# Patient Record
Sex: FEMALE | Race: WHITE | NOT HISPANIC OR LATINO | Employment: UNEMPLOYED | URBAN - METROPOLITAN AREA
[De-identification: names, ages, dates, MRNs, and addresses within clinical notes are randomized per-mention and may not be internally consistent; named-entity substitution may affect disease eponyms.]

---

## 2021-04-12 LAB
BILIRUB SERPL-MCNC: NEGATIVE MG/DL
BLOOD URINE, POC: NORMAL
CLARITY, POC UA: NORMAL
COLOR, POC UA: YELLOW
GLUCOSE UR QL STRIP: NEGATIVE
KETONES UR QL STRIP: NEGATIVE
LEUKOCYTE EST, POC UA: NEGATIVE
NITRITE, POC UA: NEGATIVE
PH, POC UA: 7.5
POC BETA-HCG (QUAL): NEGATIVE
PROTEIN, POC: NEGATIVE
SPECIFIC GRAVITY, POC UA: 1.02
UROBILINOGEN, POC UA: NORMAL

## 2022-04-07 ENCOUNTER — HISTORICAL (OUTPATIENT)
Dept: ADMINISTRATIVE | Facility: HOSPITAL | Age: 37
End: 2022-04-07

## 2022-04-14 LAB
PAP RECOMMENDATION EXT: NORMAL
PAP SMEAR: NORMAL

## 2022-04-24 VITALS
HEIGHT: 67 IN | SYSTOLIC BLOOD PRESSURE: 123 MMHG | DIASTOLIC BLOOD PRESSURE: 72 MMHG | WEIGHT: 126.44 LBS | BODY MASS INDEX: 19.84 KG/M2

## 2022-09-15 ENCOUNTER — HISTORICAL (OUTPATIENT)
Dept: ADMINISTRATIVE | Facility: HOSPITAL | Age: 37
End: 2022-09-15

## 2023-01-11 ENCOUNTER — DOCUMENTATION ONLY (OUTPATIENT)
Dept: ADMINISTRATIVE | Facility: HOSPITAL | Age: 38
End: 2023-01-11

## 2023-04-04 ENCOUNTER — CLINICAL SUPPORT (OUTPATIENT)
Dept: OBSTETRICS AND GYNECOLOGY | Facility: CLINIC | Age: 38
End: 2023-04-04
Payer: MEDICAID

## 2023-04-04 VITALS
SYSTOLIC BLOOD PRESSURE: 120 MMHG | WEIGHT: 125.25 LBS | DIASTOLIC BLOOD PRESSURE: 70 MMHG | BODY MASS INDEX: 19.66 KG/M2 | HEIGHT: 67 IN

## 2023-04-04 DIAGNOSIS — Z30.9 ENCOUNTER FOR CONTRACEPTIVE MANAGEMENT, UNSPECIFIED TYPE: Primary | ICD-10-CM

## 2023-04-04 PROCEDURE — 99213 OFFICE O/P EST LOW 20 MIN: CPT | Mod: ,,, | Performed by: NURSE PRACTITIONER

## 2023-04-04 PROCEDURE — 99213 PR OFFICE/OUTPT VISIT, EST, LEVL III, 20-29 MIN: ICD-10-PCS | Mod: ,,, | Performed by: NURSE PRACTITIONER

## 2023-04-04 RX ORDER — MEDROXYPROGESTERONE ACETATE 150 MG/ML
150 INJECTION, SUSPENSION INTRAMUSCULAR
COMMUNITY

## 2023-04-04 RX ORDER — DEXTROAMPHETAMINE SACCHARATE, AMPHETAMINE ASPARTATE, DEXTROAMPHETAMINE SULFATE AND AMPHETAMINE SULFATE 3.75; 3.75; 3.75; 3.75 MG/1; MG/1; MG/1; MG/1
15 TABLET ORAL
COMMUNITY
Start: 2022-04-14 | End: 2023-09-12 | Stop reason: ALTCHOICE

## 2023-04-04 RX ORDER — MEDROXYPROGESTERONE ACETATE 150 MG/ML
150 INJECTION, SUSPENSION INTRAMUSCULAR
Status: COMPLETED | OUTPATIENT
Start: 2023-04-04 | End: 2023-04-04

## 2023-04-04 RX ADMIN — MEDROXYPROGESTERONE ACETATE 150 MG: 150 INJECTION, SUSPENSION INTRAMUSCULAR at 03:04

## 2023-04-04 NOTE — PROGRESS NOTES
"Chief Complaint:  Injections (Present to clinic for depo injecttion, no c/o's)    History of Present Illness:  Noé is a 37 y.o.  presents for depo provera. Last injection was 2023       Review of Systems:  Patient reports no abdominal pain. She reports no hematuria, no abnormal bleeding, no flank pain, no trouble urinating, no incontinence, no rash, no lesion, no discharge, no vaginal odor, and no vaginal itching.     OB History          2    Para   1    Term   1            AB   1    Living   1         SAB        IAB        Ectopic        Multiple        Live Births   1                 History reviewed. No pertinent past medical history.      Current Outpatient Medications:     dextroamphetamine-amphetamine (ADDERALL) 15 mg tablet, Take 15 mg by mouth., Disp: , Rfl:     medroxyPROGESTERone (DEPO-PROVERA) 150 mg/mL injection, Inject 150 mg into the muscle., Disp: , Rfl:   No current facility-administered medications for this visit.    Review of patient's allergies indicates:  No Known Allergies    Past Surgical History:   Procedure Laterality Date    AUGMENTATION OF BREAST      CERVICAL BIOPSY  W/ LOOP ELECTRODE EXCISION         History reviewed. No pertinent family history.    Social History     Socioeconomic History    Marital status: Single   Tobacco Use    Smoking status: Never     Passive exposure: Never    Smokeless tobacco: Never   Substance and Sexual Activity    Alcohol use: Yes     Comment: social    Drug use: Never    Sexual activity: Yes     Partners: Male     Birth control/protection: Injection       Physical Exam:  /70 (BP Location: Left arm)   Ht 5' 7" (1.702 m)   Wt 56.8 kg (125 lb 3.5 oz)   BMI 19.61 kg/m²     APPEARANCE: Well nourished, well developed, in no acute distress.  PSYCH: Appropriate mood and affect.  EXTREMITIES: No edema.       Assessment/Plan:  Encounter for contraceptive management, unspecified type  -     medroxyPROGESTERone (DEPO-PROVERA) " injection 150 mg      Follow up in about 3 months (around 7/4/2023) for DEPO. In addition to their scheduled FU, the patient has also been instructed to follow up on as needed basis  All questions were answered and the patient voiced understanding of the above issues.

## 2023-05-10 DIAGNOSIS — M79.641 PAIN IN RIGHT HAND: Primary | ICD-10-CM

## 2023-05-12 ENCOUNTER — OFFICE VISIT (OUTPATIENT)
Dept: ORTHOPEDICS | Facility: CLINIC | Age: 38
End: 2023-05-12
Payer: MEDICAID

## 2023-05-12 ENCOUNTER — HOSPITAL ENCOUNTER (OUTPATIENT)
Dept: RADIOLOGY | Facility: HOSPITAL | Age: 38
Discharge: HOME | End: 2023-05-12
Attending: STUDENT IN AN ORGANIZED HEALTH CARE EDUCATION/TRAINING PROGRAM
Payer: MEDICAID

## 2023-05-12 VITALS — WEIGHT: 130.63 LBS | BODY MASS INDEX: 20.5 KG/M2 | HEIGHT: 67 IN

## 2023-05-12 DIAGNOSIS — M79.641 PAIN IN RIGHT HAND: ICD-10-CM

## 2023-05-12 DIAGNOSIS — M79.641 PAIN IN RIGHT HAND: Primary | ICD-10-CM

## 2023-05-12 PROCEDURE — 1159F PR MEDICATION LIST DOCUMENTED IN MEDICAL RECORD: ICD-10-PCS | Mod: CPTII,,, | Performed by: ORTHOPAEDIC SURGERY

## 2023-05-12 PROCEDURE — 99203 PR OFFICE/OUTPT VISIT, NEW, LEVL III, 30-44 MIN: ICD-10-PCS | Mod: S$PBB,,, | Performed by: ORTHOPAEDIC SURGERY

## 2023-05-12 PROCEDURE — 3008F PR BODY MASS INDEX (BMI) DOCUMENTED: ICD-10-PCS | Mod: CPTII,,, | Performed by: ORTHOPAEDIC SURGERY

## 2023-05-12 PROCEDURE — 1160F PR REVIEW ALL MEDS BY PRESCRIBER/CLIN PHARMACIST DOCUMENTED: ICD-10-PCS | Mod: CPTII,,, | Performed by: ORTHOPAEDIC SURGERY

## 2023-05-12 PROCEDURE — 99203 OFFICE O/P NEW LOW 30 MIN: CPT | Mod: S$PBB,,, | Performed by: ORTHOPAEDIC SURGERY

## 2023-05-12 PROCEDURE — 99213 OFFICE O/P EST LOW 20 MIN: CPT | Mod: PBBFAC

## 2023-05-12 PROCEDURE — 1160F RVW MEDS BY RX/DR IN RCRD: CPT | Mod: CPTII,,, | Performed by: ORTHOPAEDIC SURGERY

## 2023-05-12 PROCEDURE — 73130 X-RAY EXAM OF HAND: CPT | Mod: TC,RT

## 2023-05-12 PROCEDURE — 1159F MED LIST DOCD IN RCRD: CPT | Mod: CPTII,,, | Performed by: ORTHOPAEDIC SURGERY

## 2023-05-12 PROCEDURE — 3008F BODY MASS INDEX DOCD: CPT | Mod: CPTII,,, | Performed by: ORTHOPAEDIC SURGERY

## 2023-05-12 RX ORDER — DEXTROAMPHETAMINE SACCHARATE, AMPHETAMINE ASPARTATE, DEXTROAMPHETAMINE SULFATE AND AMPHETAMINE SULFATE 7.5; 7.5; 7.5; 7.5 MG/1; MG/1; MG/1; MG/1
1 TABLET ORAL 2 TIMES DAILY
COMMUNITY
Start: 2023-04-28

## 2023-05-12 RX ORDER — OXYCODONE AND ACETAMINOPHEN 7.5; 325 MG/1; MG/1
1 TABLET ORAL EVERY 6 HOURS PRN
COMMUNITY
Start: 2023-05-08 | End: 2023-09-12 | Stop reason: ALTCHOICE
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
## 2023-05-15 NOTE — PROGRESS NOTES
Faculty Attestation: Noé Raines  was seen at [facility] in the Orthopaedic Clinic. Patient chart reviewed. History of Present Illness, Physical Exam, and Assessment and Plan reviewed. Treatment plan is reasonable and appropriate. Compliance with treatment recommendations is important. No procedure was performed.     Govind Elizalde MD  Orthopaedic Surgery

## 2023-05-29 ENCOUNTER — HOSPITAL ENCOUNTER (OUTPATIENT)
Dept: RADIOLOGY | Facility: HOSPITAL | Age: 38
Discharge: HOME | End: 2023-05-29
Attending: SPECIALIST
Payer: MEDICAID

## 2023-05-29 ENCOUNTER — OFFICE VISIT (OUTPATIENT)
Dept: ORTHOPEDICS | Facility: CLINIC | Age: 38
End: 2023-05-29
Payer: MEDICAID

## 2023-05-29 VITALS — BODY MASS INDEX: 20.4 KG/M2 | WEIGHT: 130 LBS | HEIGHT: 67 IN

## 2023-05-29 DIAGNOSIS — M79.641 RIGHT HAND PAIN: Primary | ICD-10-CM

## 2023-05-29 DIAGNOSIS — M79.641 RIGHT HAND PAIN: ICD-10-CM

## 2023-05-29 PROCEDURE — 99213 OFFICE O/P EST LOW 20 MIN: CPT | Mod: S$PBB,,, | Performed by: SPECIALIST

## 2023-05-29 PROCEDURE — 3008F PR BODY MASS INDEX (BMI) DOCUMENTED: ICD-10-PCS | Mod: CPTII,,, | Performed by: SPECIALIST

## 2023-05-29 PROCEDURE — 1160F PR REVIEW ALL MEDS BY PRESCRIBER/CLIN PHARMACIST DOCUMENTED: ICD-10-PCS | Mod: CPTII,,, | Performed by: SPECIALIST

## 2023-05-29 PROCEDURE — 99213 PR OFFICE/OUTPT VISIT, EST, LEVL III, 20-29 MIN: ICD-10-PCS | Mod: S$PBB,,, | Performed by: SPECIALIST

## 2023-05-29 PROCEDURE — 1159F PR MEDICATION LIST DOCUMENTED IN MEDICAL RECORD: ICD-10-PCS | Mod: CPTII,,, | Performed by: SPECIALIST

## 2023-05-29 PROCEDURE — 99213 OFFICE O/P EST LOW 20 MIN: CPT | Mod: PBBFAC

## 2023-05-29 PROCEDURE — 1159F MED LIST DOCD IN RCRD: CPT | Mod: CPTII,,, | Performed by: SPECIALIST

## 2023-05-29 PROCEDURE — 73130 X-RAY EXAM OF HAND: CPT | Mod: TC,RT

## 2023-05-29 PROCEDURE — 3008F BODY MASS INDEX DOCD: CPT | Mod: CPTII,,, | Performed by: SPECIALIST

## 2023-05-29 PROCEDURE — 1160F RVW MEDS BY RX/DR IN RCRD: CPT | Mod: CPTII,,, | Performed by: SPECIALIST
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
## 2023-05-29 NOTE — PROGRESS NOTES
Faculty Attestation: Noé Raines  was seen at [facility] in the Orthopaedic Clinic. Patient chart reviewed. History of Present Illness, Physical Exam, and Assessment and Plan reviewed. Treatment plan is reasonable and appropriate. Compliance with treatment recommendations is important. No procedure was performed.     Vel Garcia MD  Orthopaedic Surgery

## 2023-06-27 ENCOUNTER — OFFICE VISIT (OUTPATIENT)
Dept: OBSTETRICS AND GYNECOLOGY | Facility: CLINIC | Age: 38
End: 2023-06-27
Payer: MEDICAID

## 2023-06-27 VITALS
WEIGHT: 125 LBS | SYSTOLIC BLOOD PRESSURE: 120 MMHG | HEIGHT: 67 IN | HEART RATE: 90 BPM | BODY MASS INDEX: 19.62 KG/M2 | DIASTOLIC BLOOD PRESSURE: 60 MMHG

## 2023-06-27 DIAGNOSIS — Z30.9 ENCOUNTER FOR CONTRACEPTIVE MANAGEMENT, UNSPECIFIED TYPE: ICD-10-CM

## 2023-06-27 DIAGNOSIS — K64.4 EXTERNAL HEMORRHOIDS: ICD-10-CM

## 2023-06-27 DIAGNOSIS — Z01.419 ROUTINE GYNECOLOGICAL EXAMINATION: Primary | ICD-10-CM

## 2023-06-27 PROCEDURE — 3078F PR MOST RECENT DIASTOLIC BLOOD PRESSURE < 80 MM HG: ICD-10-PCS | Mod: CPTII,,, | Performed by: OBSTETRICS & GYNECOLOGY

## 2023-06-27 PROCEDURE — 99395 PREV VISIT EST AGE 18-39: CPT | Mod: ,,, | Performed by: OBSTETRICS & GYNECOLOGY

## 2023-06-27 PROCEDURE — 1160F RVW MEDS BY RX/DR IN RCRD: CPT | Mod: CPTII,,, | Performed by: OBSTETRICS & GYNECOLOGY

## 2023-06-27 PROCEDURE — 3074F PR MOST RECENT SYSTOLIC BLOOD PRESSURE < 130 MM HG: ICD-10-PCS | Mod: CPTII,,, | Performed by: OBSTETRICS & GYNECOLOGY

## 2023-06-27 PROCEDURE — 99213 PR OFFICE/OUTPT VISIT, EST, LEVL III, 20-29 MIN: ICD-10-PCS | Mod: 25,,, | Performed by: OBSTETRICS & GYNECOLOGY

## 2023-06-27 PROCEDURE — 3078F DIAST BP <80 MM HG: CPT | Mod: CPTII,,, | Performed by: OBSTETRICS & GYNECOLOGY

## 2023-06-27 PROCEDURE — 99213 OFFICE O/P EST LOW 20 MIN: CPT | Mod: 25,,, | Performed by: OBSTETRICS & GYNECOLOGY

## 2023-06-27 PROCEDURE — 3008F BODY MASS INDEX DOCD: CPT | Mod: CPTII,,, | Performed by: OBSTETRICS & GYNECOLOGY

## 2023-06-27 PROCEDURE — 99395 PR PREVENTIVE VISIT,EST,18-39: ICD-10-PCS | Mod: ,,, | Performed by: OBSTETRICS & GYNECOLOGY

## 2023-06-27 PROCEDURE — 1160F PR REVIEW ALL MEDS BY PRESCRIBER/CLIN PHARMACIST DOCUMENTED: ICD-10-PCS | Mod: CPTII,,, | Performed by: OBSTETRICS & GYNECOLOGY

## 2023-06-27 PROCEDURE — 1159F MED LIST DOCD IN RCRD: CPT | Mod: CPTII,,, | Performed by: OBSTETRICS & GYNECOLOGY

## 2023-06-27 PROCEDURE — 3008F PR BODY MASS INDEX (BMI) DOCUMENTED: ICD-10-PCS | Mod: CPTII,,, | Performed by: OBSTETRICS & GYNECOLOGY

## 2023-06-27 PROCEDURE — 1159F PR MEDICATION LIST DOCUMENTED IN MEDICAL RECORD: ICD-10-PCS | Mod: CPTII,,, | Performed by: OBSTETRICS & GYNECOLOGY

## 2023-06-27 PROCEDURE — 3074F SYST BP LT 130 MM HG: CPT | Mod: CPTII,,, | Performed by: OBSTETRICS & GYNECOLOGY

## 2023-06-27 RX ORDER — MEDROXYPROGESTERONE ACETATE 150 MG/ML
150 INJECTION, SUSPENSION INTRAMUSCULAR
Status: COMPLETED | OUTPATIENT
Start: 2023-06-27 | End: 2023-06-27

## 2023-06-27 RX ORDER — FLUOXETINE 10 MG/1
10 CAPSULE ORAL
COMMUNITY
Start: 2023-05-26 | End: 2023-09-12 | Stop reason: ALTCHOICE

## 2023-06-27 RX ADMIN — MEDROXYPROGESTERONE ACETATE 150 MG: 150 INJECTION, SUSPENSION INTRAMUSCULAR at 02:06
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
## 2023-06-30 LAB — PSYCHE PATHOLOGY RESULT: NORMAL

## 2023-07-07 ENCOUNTER — TELEPHONE (OUTPATIENT)
Dept: OBSTETRICS AND GYNECOLOGY | Facility: CLINIC | Age: 38
End: 2023-07-07
Payer: MEDICAID

## 2023-07-07 NOTE — TELEPHONE ENCOUNTER
Spoke with patient, prescription was sent to [facility] and pt requested [facility]. Advised pt to call [facility] and have them transfer the script.

## 2023-07-07 NOTE — TELEPHONE ENCOUNTER
----- Message from Kelsy Kelley sent at 7/6/2023  1:59 PM CDT -----  Regarding: RX  PT CALLED ABOUT HER TEST CREAM BEING SENT TO THE WRONG PHARMACY AND NEEDS A NURSE TO CALL HER BACK.

## 2023-07-10 ENCOUNTER — OFFICE VISIT (OUTPATIENT)
Dept: ORTHOPEDICS | Facility: CLINIC | Age: 38
End: 2023-07-10
Payer: MEDICAID

## 2023-07-10 ENCOUNTER — HOSPITAL ENCOUNTER (OUTPATIENT)
Dept: RADIOLOGY | Facility: HOSPITAL | Age: 38
Discharge: HOME | End: 2023-07-10
Attending: STUDENT IN AN ORGANIZED HEALTH CARE EDUCATION/TRAINING PROGRAM
Payer: MEDICAID

## 2023-07-10 VITALS — BODY MASS INDEX: 19.62 KG/M2 | HEIGHT: 67 IN | WEIGHT: 125 LBS

## 2023-07-10 DIAGNOSIS — M79.641 PAIN IN RIGHT HAND: Primary | ICD-10-CM

## 2023-07-10 DIAGNOSIS — M79.641 PAIN IN RIGHT HAND: ICD-10-CM

## 2023-07-10 PROCEDURE — 99213 OFFICE O/P EST LOW 20 MIN: CPT | Mod: S$PBB,,, | Performed by: STUDENT IN AN ORGANIZED HEALTH CARE EDUCATION/TRAINING PROGRAM

## 2023-07-10 PROCEDURE — 1159F PR MEDICATION LIST DOCUMENTED IN MEDICAL RECORD: ICD-10-PCS | Mod: CPTII,,, | Performed by: STUDENT IN AN ORGANIZED HEALTH CARE EDUCATION/TRAINING PROGRAM

## 2023-07-10 PROCEDURE — 3008F PR BODY MASS INDEX (BMI) DOCUMENTED: ICD-10-PCS | Mod: CPTII,,, | Performed by: STUDENT IN AN ORGANIZED HEALTH CARE EDUCATION/TRAINING PROGRAM

## 2023-07-10 PROCEDURE — 99213 OFFICE O/P EST LOW 20 MIN: CPT | Mod: PBBFAC

## 2023-07-10 PROCEDURE — 99213 PR OFFICE/OUTPT VISIT, EST, LEVL III, 20-29 MIN: ICD-10-PCS | Mod: S$PBB,,, | Performed by: STUDENT IN AN ORGANIZED HEALTH CARE EDUCATION/TRAINING PROGRAM

## 2023-07-10 PROCEDURE — 73130 X-RAY EXAM OF HAND: CPT | Mod: TC,RT

## 2023-07-10 PROCEDURE — 3008F BODY MASS INDEX DOCD: CPT | Mod: CPTII,,, | Performed by: STUDENT IN AN ORGANIZED HEALTH CARE EDUCATION/TRAINING PROGRAM

## 2023-07-10 PROCEDURE — 1159F MED LIST DOCD IN RCRD: CPT | Mod: CPTII,,, | Performed by: STUDENT IN AN ORGANIZED HEALTH CARE EDUCATION/TRAINING PROGRAM
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
## 2023-07-11 ENCOUNTER — TELEPHONE (OUTPATIENT)
Dept: OBSTETRICS AND GYNECOLOGY | Facility: CLINIC | Age: 38
End: 2023-07-11
Payer: MEDICAID

## 2023-07-11 NOTE — TELEPHONE ENCOUNTER
Spoke with patient states was using  Testosterone 4%. Called [facility] pharmacy and gave verbal to increase to 4 % pt will use the 2 % and double dose until she runs out.

## 2023-07-11 NOTE — TELEPHONE ENCOUNTER
----- Message from Kelsy Kelley sent at 7/11/2023  9:23 AM CDT -----  Regarding: CALL BACK  Pt needs a nurse to call her back about her prescription.

## 2023-07-12 NOTE — PROGRESS NOTES
Faculty Attestation: Noé Raines  was seen at [facility] in the Orthopaedic Clinic. Patient seen and evaluated at the time of the visit. History of Present Illness, Physical Exam, and Assessment and Plan reviewed. Treatment plan is reasonable and appropriate. Compliance with treatment recommendations is important. No procedure was performed.     Everardo Wright MD  Orthopaedic Surgery

## 2023-08-31 ENCOUNTER — TELEPHONE (OUTPATIENT)
Dept: OBSTETRICS AND GYNECOLOGY | Facility: CLINIC | Age: 38
End: 2023-08-31
Payer: MEDICAID

## 2023-08-31 NOTE — TELEPHONE ENCOUNTER
----- Message from Kelsy Kelley sent at 8/31/2023  1:09 PM CDT -----  Regarding: CALL BACK  PT SAID THAT HER TESTOSTERONE CREAM DOSE WAS SUPPOSED TO BE UPPED BUT THE PHARMACY SAID THAT THEY ONLY HAVE THE OLD RX.

## 2023-08-31 NOTE — TELEPHONE ENCOUNTER
CALLED PT.  CONFIRMED. STATES TESTOSTERONE CREAM WAS SUPPOSED TO BE INCREASED TO 4 MG AND PHARMACY DOES NOT HAVE RX. INFORMED PT. GUY NUÑEZ HAD CALLED [facility] AND GAVE A VERBAL ORDER TO INCREASE DOSAGE, BUT INSTRUCTED PT. I WILL CALL PHARMACY AGAIN AND TO CHECK WITH PHARMACY IN 30 MINUTES AND CALL IF NEEDS. I CALLED PHARMACIST AT [facility] IN [place] AND THEY DO NOT HAVE THE RX.TESTOSTERONE VERSA BASE CREAM 4 MG TOPICALLY . USE 4 CLICKS ROTATING SITES AT BEDTIME DISPENSE 30 ML WITH 5 RF AS ORDERED PER DR. MACEDO.

## 2023-09-12 ENCOUNTER — HOSPITAL ENCOUNTER (OUTPATIENT)
Dept: RADIOLOGY | Facility: HOSPITAL | Age: 38
Discharge: HOME | End: 2023-09-12
Attending: SURGERY
Payer: MEDICAID

## 2023-09-12 ENCOUNTER — HOSPITAL ENCOUNTER (OUTPATIENT)
Dept: PREADMISSION TESTING | Facility: HOSPITAL | Age: 38
Discharge: HOME | End: 2023-09-12
Attending: SURGERY
Payer: MEDICAID

## 2023-09-12 VITALS — WEIGHT: 129.19 LBS | BODY MASS INDEX: 20.28 KG/M2 | HEIGHT: 67 IN

## 2023-09-12 DIAGNOSIS — Z01.818 PREOP EXAMINATION: ICD-10-CM

## 2023-09-12 DIAGNOSIS — Z01.818 PREOP EXAMINATION: Primary | ICD-10-CM

## 2023-09-12 LAB
ALBUMIN SERPL-MCNC: 4.9 G/DL (ref 3.4–5)
ALBUMIN/GLOB SERPL: 2.3 RATIO
ALP SERPL-CCNC: 38 UNIT/L (ref 50–144)
ALT SERPL-CCNC: 20 UNIT/L (ref 1–45)
ANION GAP SERPL CALC-SCNC: 8 MEQ/L (ref 2–13)
AST SERPL-CCNC: 23 UNIT/L (ref 14–36)
BASOPHILS # BLD AUTO: 0.03 X10(3)/MCL (ref 0.01–0.08)
BASOPHILS NFR BLD AUTO: 0.6 % (ref 0.1–1.2)
BILIRUB SERPL-MCNC: 0.5 MG/DL (ref 0–1)
BUN SERPL-MCNC: 12 MG/DL (ref 7–20)
CALCIUM SERPL-MCNC: 9.2 MG/DL (ref 8.4–10.2)
CHLORIDE SERPL-SCNC: 104 MMOL/L (ref 98–110)
CO2 SERPL-SCNC: 28 MMOL/L (ref 21–32)
CREAT SERPL-MCNC: 0.76 MG/DL (ref 0.66–1.25)
CREAT/UREA NIT SERPL: 16 (ref 12–20)
EOSINOPHIL # BLD AUTO: 0.26 X10(3)/MCL (ref 0.04–0.36)
EOSINOPHIL NFR BLD AUTO: 5 % (ref 0.7–7)
ERYTHROCYTE [DISTWIDTH] IN BLOOD BY AUTOMATED COUNT: 11.9 % (ref 11–14.5)
GFR SERPLBLD CREATININE-BSD FMLA CKD-EPI: >90 MLS/MIN/1.73/M2
GLOBULIN SER-MCNC: 2.1 GM/DL (ref 2–3.9)
GLUCOSE SERPL-MCNC: 84 MG/DL (ref 70–115)
HCT VFR BLD AUTO: 41.7 % (ref 36–48)
HGB BLD-MCNC: 13.6 G/DL (ref 11.8–16)
IMM GRANULOCYTES # BLD AUTO: 0.01 X10(3)/MCL (ref 0–0.03)
IMM GRANULOCYTES NFR BLD AUTO: 0.2 % (ref 0–0.5)
INR PPP: 1
LYMPHOCYTES # BLD AUTO: 1.43 X10(3)/MCL (ref 1.16–3.74)
LYMPHOCYTES NFR BLD AUTO: 27.3 % (ref 20–55)
MCH RBC QN AUTO: 31.3 PG (ref 27–34)
MCHC RBC AUTO-ENTMCNC: 32.6 G/DL (ref 31–37)
MCV RBC AUTO: 96.1 FL (ref 79–99)
MONOCYTES # BLD AUTO: 0.37 X10(3)/MCL (ref 0.24–0.36)
MONOCYTES NFR BLD AUTO: 7.1 % (ref 4.7–12.5)
NEUTROPHILS # BLD AUTO: 3.14 X10(3)/MCL (ref 1.56–6.13)
NEUTROPHILS NFR BLD AUTO: 59.8 % (ref 37–73)
NRBC BLD AUTO-RTO: 0 %
PLATELET # BLD AUTO: 303 X10(3)/MCL (ref 140–371)
PMV BLD AUTO: 9.9 FL (ref 9.4–12.4)
POTASSIUM SERPL-SCNC: 4 MMOL/L (ref 3.5–5.1)
PROT SERPL-MCNC: 7 GM/DL (ref 6.3–8.2)
PROTHROMBIN TIME: 9.7 SECONDS (ref 9.3–11.9)
RBC # BLD AUTO: 4.34 X10(6)/MCL (ref 4–5.1)
SODIUM SERPL-SCNC: 140 MMOL/L (ref 135–145)
WBC # SPEC AUTO: 5.24 X10(3)/MCL (ref 4–11.5)

## 2023-09-12 PROCEDURE — 85610 PROTHROMBIN TIME: CPT | Performed by: SURGERY

## 2023-09-12 PROCEDURE — 80053 COMPREHEN METABOLIC PANEL: CPT | Performed by: SURGERY

## 2023-09-12 PROCEDURE — 85025 COMPLETE CBC W/AUTO DIFF WBC: CPT | Performed by: SURGERY

## 2023-09-12 PROCEDURE — 71046 X-RAY EXAM CHEST 2 VIEWS: CPT | Mod: TC

## 2023-09-12 PROCEDURE — 93010 ELECTROCARDIOGRAM REPORT: CPT | Mod: ,,, | Performed by: INTERNAL MEDICINE

## 2023-09-12 PROCEDURE — 93010 EKG 12-LEAD: ICD-10-PCS | Mod: ,,, | Performed by: INTERNAL MEDICINE

## 2023-09-12 PROCEDURE — 93005 ELECTROCARDIOGRAM TRACING: CPT

## 2023-09-12 RX ORDER — CEFAZOLIN SODIUM IN 0.9 % NACL 2 G/100 ML
2 PLASTIC BAG, INJECTION (ML) INTRAVENOUS
Status: CANCELLED | OUTPATIENT
Start: 2023-09-15

## 2023-09-12 RX ORDER — DEXTROSE MONOHYDRATE AND SODIUM CHLORIDE 5; .45 G/100ML; G/100ML
INJECTION, SOLUTION INTRAVENOUS CONTINUOUS
Status: CANCELLED | OUTPATIENT
Start: 2023-09-15

## 2023-09-12 RX ORDER — CETIRIZINE HYDROCHLORIDE 10 MG/1
10 TABLET ORAL NIGHTLY
COMMUNITY
Start: 2023-08-29

## 2023-09-12 NOTE — DISCHARGE INSTRUCTIONS

## 2023-09-13 ENCOUNTER — ANESTHESIA EVENT (OUTPATIENT)
Dept: SURGERY | Facility: HOSPITAL | Age: 38
End: 2023-09-13
Payer: MEDICAID

## 2023-09-13 NOTE — ANESTHESIA PREPROCEDURE EVALUATION
09/13/2023  Noé Raines is a 38 y.o., female.  No history of  Nausea  Healthy, some anxiety  Former smoker.  Lives in [place]      Pre-op Assessment    I have reviewed the Patient Summary Reports.     I have reviewed the Nursing Notes. I have reviewed the NPO Status.   I have reviewed the Medications.     Review of Systems  Anesthesia Hx:  No problems with previous Anesthesia  Denies Family Hx of Anesthesia complications.   Denies Personal Hx of Anesthesia complications.   Hematology/Oncology:  Hematology Normal   Oncology Normal     EENT/Dental:EENT/Dental Normal   Cardiovascular:  Cardiovascular Normal Exercise tolerance: good     Pulmonary:  Pulmonary Normal    Renal/:  Renal/ Normal     Hepatic/GI:  Hepatic/GI Normal    Musculoskeletal:  Musculoskeletal Normal    Neurological:  Neurology Normal    Endocrine:  Endocrine Normal    Dermatological:  Skin Normal    Psych:   Psychiatric History anxiety          Physical Exam  General: Well nourished, Cooperative, Alert and Oriented    Airway:  Mallampati: II / II  Mouth Opening: Normal  TM Distance: Normal  Tongue: Normal  Neck ROM: Normal ROM    Dental:  Intact        Anesthesia Plan  Type of Anesthesia, risks & benefits discussed:    Anesthesia Type: Gen ETT  Intra-op Monitoring Plan: Standard ASA Monitors  Post Op Pain Control Plan: multimodal analgesia  Induction:  IV  Airway Plan: Direct  Informed Consent: Informed consent signed with the Patient and all parties understand the risks and agree with anesthesia plan.  All questions answered. Patient consented to blood products? Yes  ASA Score: 2  Day of Surgery Review of History & Physical: H&P Update referred to the surgeon/provider.I have interviewed and examined the patient. I have reviewed the patient's H&P dated: There are no significant changes.     Ready For Surgery From Anesthesia Perspective.      .

## 2023-09-15 ENCOUNTER — HOSPITAL ENCOUNTER (OUTPATIENT)
Facility: HOSPITAL | Age: 38
Discharge: HOME | End: 2023-09-15
Attending: SURGERY | Admitting: SURGERY
Payer: MEDICAID

## 2023-09-15 ENCOUNTER — ANESTHESIA (OUTPATIENT)
Dept: SURGERY | Facility: HOSPITAL | Age: 38
End: 2023-09-15
Payer: MEDICAID

## 2023-09-15 VITALS
BODY MASS INDEX: 20.25 KG/M2 | HEIGHT: 67 IN | TEMPERATURE: 98 F | RESPIRATION RATE: 18 BRPM | HEART RATE: 65 BPM | SYSTOLIC BLOOD PRESSURE: 132 MMHG | OXYGEN SATURATION: 100 % | DIASTOLIC BLOOD PRESSURE: 81 MMHG | WEIGHT: 129 LBS

## 2023-09-15 DIAGNOSIS — K64.2 GRADE III HEMORRHOIDS: Primary | ICD-10-CM

## 2023-09-15 DIAGNOSIS — Z01.818 PREOP EXAMINATION: ICD-10-CM

## 2023-09-15 LAB — B-HCG SERPL QL: NEGATIVE

## 2023-09-15 PROCEDURE — D9220A PRA ANESTHESIA: ICD-10-PCS | Mod: ,,, | Performed by: NURSE ANESTHETIST, CERTIFIED REGISTERED

## 2023-09-15 PROCEDURE — 63600175 PHARM REV CODE 636 W HCPCS: Performed by: SURGERY

## 2023-09-15 PROCEDURE — S5010 5% DEXTROSE AND 0.45% SALINE: HCPCS | Performed by: NURSE ANESTHETIST, CERTIFIED REGISTERED

## 2023-09-15 PROCEDURE — S5010 5% DEXTROSE AND 0.45% SALINE: HCPCS | Performed by: SURGERY

## 2023-09-15 PROCEDURE — 25000003 PHARM REV CODE 250: Performed by: SURGERY

## 2023-09-15 PROCEDURE — D9220A PRA ANESTHESIA: Mod: ,,, | Performed by: NURSE ANESTHETIST, CERTIFIED REGISTERED

## 2023-09-15 PROCEDURE — 36000707: Performed by: SURGERY

## 2023-09-15 PROCEDURE — 71000016 HC POSTOP RECOV ADDL HR: Performed by: SURGERY

## 2023-09-15 PROCEDURE — 81025 URINE PREGNANCY TEST: CPT | Performed by: SURGERY

## 2023-09-15 PROCEDURE — 27201423 OPTIME MED/SURG SUP & DEVICES STERILE SUPPLY: Performed by: SURGERY

## 2023-09-15 PROCEDURE — 63600175 PHARM REV CODE 636 W HCPCS: Performed by: NURSE ANESTHETIST, CERTIFIED REGISTERED

## 2023-09-15 PROCEDURE — 25000003 PHARM REV CODE 250: Performed by: NURSE ANESTHETIST, CERTIFIED REGISTERED

## 2023-09-15 PROCEDURE — 36000706: Performed by: SURGERY

## 2023-09-15 PROCEDURE — 37000009 HC ANESTHESIA EA ADD 15 MINS: Performed by: SURGERY

## 2023-09-15 PROCEDURE — 37000008 HC ANESTHESIA 1ST 15 MINUTES: Performed by: SURGERY

## 2023-09-15 PROCEDURE — 71000015 HC POSTOP RECOV 1ST HR: Performed by: SURGERY

## 2023-09-15 PROCEDURE — 71000033 HC RECOVERY, INTIAL HOUR: Performed by: SURGERY

## 2023-09-15 RX ORDER — PROPOFOL 10 MG/ML
VIAL (ML) INTRAVENOUS
Status: DISCONTINUED | OUTPATIENT
Start: 2023-09-15 | End: 2023-09-15

## 2023-09-15 RX ORDER — HYDROCODONE BITARTRATE AND ACETAMINOPHEN 10; 325 MG/1; MG/1
1 TABLET ORAL ONCE
Status: COMPLETED | OUTPATIENT
Start: 2023-09-15 | End: 2023-09-15

## 2023-09-15 RX ORDER — BUPIVACAINE HYDROCHLORIDE 5 MG/ML
INJECTION, SOLUTION EPIDURAL; INTRACAUDAL
Status: DISCONTINUED | OUTPATIENT
Start: 2023-09-15 | End: 2023-09-15 | Stop reason: HOSPADM

## 2023-09-15 RX ORDER — CEFAZOLIN SODIUM IN 0.9 % NACL 2 G/100 ML
2 PLASTIC BAG, INJECTION (ML) INTRAVENOUS
Status: DISCONTINUED | OUTPATIENT
Start: 2023-09-15 | End: 2023-09-15

## 2023-09-15 RX ORDER — DEXTROSE MONOHYDRATE AND SODIUM CHLORIDE 5; .45 G/100ML; G/100ML
INJECTION, SOLUTION INTRAVENOUS CONTINUOUS
Status: DISCONTINUED | OUTPATIENT
Start: 2023-09-15 | End: 2023-09-15 | Stop reason: HOSPADM

## 2023-09-15 RX ORDER — SODIUM CHLORIDE 9 MG/ML
INJECTION, SOLUTION INTRAVENOUS CONTINUOUS
Status: DISCONTINUED | OUTPATIENT
Start: 2023-09-15 | End: 2023-09-15 | Stop reason: HOSPADM

## 2023-09-15 RX ORDER — ONDANSETRON 2 MG/ML
INJECTION INTRAMUSCULAR; INTRAVENOUS
Status: DISCONTINUED | OUTPATIENT
Start: 2023-09-15 | End: 2023-09-15

## 2023-09-15 RX ORDER — LIDOCAINE HYDROCHLORIDE 20 MG/ML
INJECTION INTRAVENOUS
Status: DISCONTINUED | OUTPATIENT
Start: 2023-09-15 | End: 2023-09-15

## 2023-09-15 RX ORDER — MIDAZOLAM HYDROCHLORIDE 1 MG/ML
2 INJECTION INTRAMUSCULAR; INTRAVENOUS
Status: COMPLETED | OUTPATIENT
Start: 2023-09-15 | End: 2023-09-15

## 2023-09-15 RX ORDER — DEXTROSE MONOHYDRATE AND SODIUM CHLORIDE 5; .45 G/100ML; G/100ML
INJECTION, SOLUTION INTRAVENOUS CONTINUOUS PRN
Status: DISCONTINUED | OUTPATIENT
Start: 2023-09-15 | End: 2023-09-15

## 2023-09-15 RX ORDER — DEXAMETHASONE SODIUM PHOSPHATE 4 MG/ML
INJECTION, SOLUTION INTRA-ARTICULAR; INTRALESIONAL; INTRAMUSCULAR; INTRAVENOUS; SOFT TISSUE
Status: DISCONTINUED | OUTPATIENT
Start: 2023-09-15 | End: 2023-09-15

## 2023-09-15 RX ORDER — VECURONIUM BROMIDE FOR INJECTION 1 MG/ML
INJECTION, POWDER, LYOPHILIZED, FOR SOLUTION INTRAVENOUS
Status: DISCONTINUED | OUTPATIENT
Start: 2023-09-15 | End: 2023-09-15

## 2023-09-15 RX ORDER — FENTANYL CITRATE 50 UG/ML
INJECTION, SOLUTION INTRAMUSCULAR; INTRAVENOUS
Status: DISCONTINUED | OUTPATIENT
Start: 2023-09-15 | End: 2023-09-15

## 2023-09-15 RX ORDER — FAMOTIDINE 20 MG/1
20 TABLET, FILM COATED ORAL
Status: COMPLETED | OUTPATIENT
Start: 2023-09-15 | End: 2023-09-15

## 2023-09-15 RX ORDER — LIDOCAINE HYDROCHLORIDE 10 MG/ML
INJECTION INFILTRATION; PERINEURAL
Status: DISCONTINUED | OUTPATIENT
Start: 2023-09-15 | End: 2023-09-15 | Stop reason: HOSPADM

## 2023-09-15 RX ORDER — KETOROLAC TROMETHAMINE 30 MG/ML
INJECTION, SOLUTION INTRAMUSCULAR; INTRAVENOUS
Status: DISCONTINUED | OUTPATIENT
Start: 2023-09-15 | End: 2023-09-15

## 2023-09-15 RX ADMIN — ONDANSETRON 4 MG: 2 INJECTION INTRAMUSCULAR; INTRAVENOUS at 10:09

## 2023-09-15 RX ADMIN — FAMOTIDINE 20 MG: 20 TABLET, FILM COATED ORAL at 09:09

## 2023-09-15 RX ADMIN — FENTANYL CITRATE 50 MCG: 50 INJECTION, SOLUTION INTRAMUSCULAR; INTRAVENOUS at 10:09

## 2023-09-15 RX ADMIN — LIDOCAINE HYDROCHLORIDE 50 MG: 20 INJECTION, SOLUTION INTRAVENOUS at 10:09

## 2023-09-15 RX ADMIN — DEXTROSE AND SODIUM CHLORIDE: 5; 450 INJECTION, SOLUTION INTRAVENOUS at 10:09

## 2023-09-15 RX ADMIN — HYDROCODONE BITARTRATE AND ACETAMINOPHEN 1 TABLET: 10; 325 TABLET ORAL at 12:09

## 2023-09-15 RX ADMIN — PROPOFOL 200 MG: 10 INJECTION, EMULSION INTRAVENOUS at 10:09

## 2023-09-15 RX ADMIN — VECURONIUM BROMIDE 5 MG: 10 INJECTION, POWDER, FOR SOLUTION INTRAVENOUS at 10:09

## 2023-09-15 RX ADMIN — DEXTROSE AND SODIUM CHLORIDE: 5; 450 INJECTION, SOLUTION INTRAVENOUS at 09:09

## 2023-09-15 RX ADMIN — SUGAMMADEX 200 MG: 100 INJECTION, SOLUTION INTRAVENOUS at 11:09

## 2023-09-15 RX ADMIN — CEFAZOLIN 2 G: 2 INJECTION, POWDER, FOR SOLUTION INTRAMUSCULAR; INTRAVENOUS at 10:09

## 2023-09-15 RX ADMIN — MIDAZOLAM HYDROCHLORIDE 2 MG: 1 INJECTION, SOLUTION INTRAMUSCULAR; INTRAVENOUS at 10:09

## 2023-09-15 RX ADMIN — DEXAMETHASONE SODIUM PHOSPHATE 8 MG: 4 INJECTION, SOLUTION INTRA-ARTICULAR; INTRALESIONAL; INTRAMUSCULAR; INTRAVENOUS; SOFT TISSUE at 10:09

## 2023-09-15 RX ADMIN — GLYCOPYRROLATE 0.2 MG: 0.2 INJECTION, SOLUTION INTRAMUSCULAR; INTRAVITREAL at 09:09

## 2023-09-15 RX ADMIN — KETOROLAC TROMETHAMINE 30 MG: 30 INJECTION, SOLUTION INTRAMUSCULAR; INTRAVENOUS at 11:09

## 2023-09-15 NOTE — ANESTHESIA POSTPROCEDURE EVALUATION
Anesthesia Post Evaluation    Patient: Noé Raines    Procedure(s) Performed: Procedure(s) (LRB):  HEMORRHOIDECTOMY (N/A)    Final Anesthesia Type: MAC      Patient location during evaluation: OPS  Patient participation: Yes- Able to Participate  Level of consciousness: awake and alert, awake and oriented  Post-procedure vital signs: reviewed and stable  Pain management: adequate  Airway patency: patent    PONV status at discharge: No PONV  Anesthetic complications: no      Cardiovascular status: blood pressure returned to baseline  Respiratory status: unassisted, room air and spontaneous ventilation  Hydration status: euvolemic  Follow-up not needed.          Vitals Value Taken Time   /79 09/15/23 1245   Temp 36.3 °C (97.4 °F) 09/15/23 1200   Pulse 63 09/15/23 1245   Resp 20 09/15/23 1245   SpO2 100 % 09/15/23 1245         Event Time   Out of Recovery 11:55:00         Pain/Sea Score: Pain Rating Prior to Med Admin: 8 (9/15/2023 12:27 PM)  Sea Score: 10 (9/15/2023 11:55 AM)

## 2023-09-15 NOTE — ANESTHESIA PROCEDURE NOTES
Intubation    Date/Time: 9/15/2023 10:37 AM    Performed by: Owen Foster II, CRNA  Authorized by: Owen Foster II, CRNA    Intubation:     Induction:  Intravenous    Intubated:  Postinduction    Mask Ventilation:  Easy mask    Attempts:  1    Attempted By:  CRNA    Method of Intubation:  Direct    Blade:  Fischer 2    Laryngeal View Grade: Grade I - full view of cords      Difficult Airway Encountered?: No      Complications:  None    Airway Device:  Oral endotracheal tube    Airway Device Size:  7.0    Style/Cuff Inflation:  Cuffed    Inflation Amount (mL):  5    Tube secured:  22    Secured at:  The lips    Placement Verified By:  Capnometry    Complicating Factors:  None    Findings Post-Intubation:  BS equal bilateral and atraumatic/condition of teeth unchanged

## 2023-09-15 NOTE — DISCHARGE SUMMARY
[facility]  Discharge Note  Short Stay    Procedure(s) (LRB):  HEMORRHOIDECTOMY (N/A)      OUTCOME: Patient tolerated treatment/procedure well without complication and is now ready for discharge.    DISPOSITION: Home or Self Care    FINAL DIAGNOSIS:  Grade III hemorrhoids PPH stapling removal of skin tags    FOLLOWUP: In clinic one-week    DISCHARGE INSTRUCTIONS:    Discharge Procedure Orders   Diet general        TIME SPENT ON DISCHARGE:  5 minutes

## 2023-09-15 NOTE — OP NOTE
[facility]  Operative Note      Date of Procedure: 9/15/2023     Procedure: Procedure(s) (LRB):  HEMORRHOIDECTOMY (N/A)     Surgeon(s) and Role:     * Jamie Romero MD - Primary    Assisting Surgeon: None    Pre-Operative Diagnosis: Grade III hemorrhoids [K64.2]    Post-Operative Diagnosis: Post-Op Diagnosis Codes:     * Grade III hemorrhoids [K64.2]  Removal of skin tag at the 6:00 anterior and 9:00. left position  Incision drainage of thrombosed external hemorrhoids at posterior and right 3;00  Anesthesia: General    Operative Findings (including complications, if any):  Patient is a 38-year-old  female with ADHD on Adderall smokes a pack a day for about 10 years quit 10 years ago had symptomatic hemorrhoids that with pain tenderness itching swelling that required PPH stapling.    Patient was placed in prone dilia-knife position after spinal anesthetic and prepped and draped in a sterile fashion had an anal dilator and anal probe inserted into the anorectum and then per suturing of the anal dilator with a 0 silk in 4 quadrants.  Patient then underwent placement of an anal probe and pursestring suturing of the anorectal mucosa just above the dentate line.  Resection with a 33 mm Covidien stapler was accomplished.  Good hemostasis was achieved with Bovie cautery and plain gut suture.  Patient had external hemorrhoids thrombosed at the right and posterior positions drained with a Bovie cautery.  Patient had skin tags at the 6:00  anterior and 9:00 left position that were excised with a 15 blade scalpel and Bovie cautery.  Patient has Surgicel Gelfoam and sterile packing applied.  No problems were encountered patient tolerated procedure well     Plan  Follow up with me in the office in a week   Wash with soap and water after every bowel movement   Stay on a liquid diet for a week           Description of Technical Procedures:  Noted above       Significant Surgical Tasks  Conducted by the Assistant(s), if Applicable:  None       Estimated Blood Loss (EBL): * No values recorded between 9/15/2023 10:58 AM and 9/15/2023 11:17 AM *           Implants: * No implants in log *    Specimens:   Specimen (24h ago, onward)       Start     Ordered    09/15/23 1110  Specimen to Pathology  RELEASE UPON ORDERING        Comments: Specimen A: 6 o'clock anterior single stitch  9 o'clock left double stitch      Specimen B:  6 o'clock skin tag           References:    Click here for ordering Quick Tip   Question:  Release to patient  Answer:  Immediate    09/15/23 1110    09/15/23 0958  Specimen to Pathology General Surgery  Once        References:    Click here for ordering Quick Tip   Question Answer Comment   Procedure Type: General Surgery    Specimen Source Hemorrhoids    Clinical Information: Hemmorhoids    Release to patient Immediate        09/15/23 0957                            Condition: Good      Disposition: PACU - hemodynamically stable.    Attestation: I was present and scrubbed for the entire procedure.    Discharge Note    OUTCOME: Patient tolerated treatment/procedure well without complication and is now ready for discharge.    DISPOSITION: Home or Self Care    FINAL DIAGNOSIS:  Grade III hemorrhoids    FOLLOWUP: In clinic one-week    DISCHARGE INSTRUCTIONS:    Discharge Procedure Orders   Diet general

## 2023-09-15 NOTE — PLAN OF CARE
PT IS BACK TO ROOM, AWAKE AND ALERT, IN STABLE CONDITION, NO DIFFICULTY BREATHING OR SWALLOWING, NO RECTAL BLEEDING NOTED, + FLATUS NOTED, PT IS TOLERATING LIQUIDS, FAMILY AT SIDE, SR X2, CB IN REACH. WILL CONTINUE TO MONITOR PT.

## 2023-09-15 NOTE — TRANSFER OF CARE
"Anesthesia Transfer of Care Note    Patient: Noé Raines    Procedure(s) Performed: Procedure(s) (LRB):  HEMORRHOIDECTOMY (N/A)    Patient location: PACU    Anesthesia Type: general    Transport from OR: Transported from OR on room air with adequate spontaneous ventilation    Post pain: adequate analgesia    Post assessment: no apparent anesthetic complications    Post vital signs: stable    Level of consciousness: responds to stimulation    Nausea/Vomiting: no nausea/vomiting    Complications: none    Transfer of care protocol was followed      Last vitals:   Visit Vitals  /77 (BP Location: Right arm, Patient Position: Lying)   Pulse 66   Temp 36.6 °C (97.9 °F) (Temporal)   Resp 18   Ht 5' 7" (1.702 m)   Wt 58.5 kg (129 lb)   LMP 01/15/2008   SpO2 100%   Breastfeeding No   BMI 20.20 kg/m²     "

## 2023-09-15 NOTE — DISCHARGE INSTRUCTIONS
SEE HANDOUT.  Diet as tolerated.  Notify MD for fever of 101.0 or over, excessive pain unrelieved by your medication, excessive nausea/vomiting, excessive bright red bleeding from rectum, foul odor from rectum.  You should expel the rectal packing with your first bowel movement.  With each bowel movement, clean rectum with soap and water, do not use toilet paper or wipes - use a soft washcloth.  Try not to strain having a bowel movement or sit on the toilet for lengthy periods of time.   TAKE STOOL SOFTENERS AS NEEDED

## 2023-09-18 ENCOUNTER — CLINICAL SUPPORT (OUTPATIENT)
Dept: OBSTETRICS AND GYNECOLOGY | Facility: CLINIC | Age: 38
End: 2023-09-18
Payer: MEDICAID

## 2023-09-18 VITALS
SYSTOLIC BLOOD PRESSURE: 128 MMHG | BODY MASS INDEX: 19.87 KG/M2 | HEIGHT: 67 IN | DIASTOLIC BLOOD PRESSURE: 64 MMHG | WEIGHT: 126.56 LBS | HEART RATE: 86 BPM

## 2023-09-18 DIAGNOSIS — Z30.9 ENCOUNTER FOR CONTRACEPTIVE MANAGEMENT, UNSPECIFIED TYPE: Primary | ICD-10-CM

## 2023-09-18 PROCEDURE — 99213 PR OFFICE/OUTPT VISIT, EST, LEVL III, 20-29 MIN: ICD-10-PCS | Mod: ,,, | Performed by: NURSE PRACTITIONER

## 2023-09-18 PROCEDURE — 99213 OFFICE O/P EST LOW 20 MIN: CPT | Mod: ,,, | Performed by: NURSE PRACTITIONER

## 2023-09-18 RX ORDER — HYDROCODONE BITARTRATE AND ACETAMINOPHEN 5; 325 MG/1; MG/1
1 TABLET ORAL EVERY 6 HOURS PRN
COMMUNITY
Start: 2023-09-15 | End: 2024-03-04 | Stop reason: ALTCHOICE

## 2023-09-18 RX ORDER — MEDROXYPROGESTERONE ACETATE 150 MG/ML
150 INJECTION, SUSPENSION INTRAMUSCULAR
Status: COMPLETED | OUTPATIENT
Start: 2023-09-18 | End: 2023-09-18

## 2023-09-18 RX ORDER — TESTOSTERONE 25 MG/2.5G
GEL TRANSDERMAL
COMMUNITY
Start: 2023-08-31

## 2023-09-18 RX ADMIN — MEDROXYPROGESTERONE ACETATE 150 MG: 150 INJECTION, SUSPENSION INTRAMUSCULAR at 11:09

## 2023-09-18 NOTE — PROGRESS NOTES
Chief Complaint:  DEPO INJECTION (NO C/O'S.)    History of Present Illness:  Noé is a 38 y.o.  presents for depo provera. Last injection was 2023 DEPO DUE 2023       Review of Systems:  Patient reports no abdominal pain. She reports no hematuria, no abnormal bleeding, no flank pain, no trouble urinating, no incontinence, no rash, no lesion, no discharge, no vaginal odor, and no vaginal itching.     OB History          2    Para   1    Term   1            AB   1    Living   1         SAB        IAB        Ectopic        Multiple        Live Births   1                 Past Medical History:   Diagnosis Date    ADHD     Athlete's heart          Current Outpatient Medications:     cetirizine (ZYRTEC) 10 MG tablet, Take 10 mg by mouth every evening., Disp: , Rfl:     dextroamphetamine-amphetamine 30 mg Tab, Take 1 tablet by mouth 2 (two) times daily., Disp: , Rfl:     HYDROcodone-acetaminophen (NORCO) 5-325 mg per tablet, Take 1 tablet by mouth every 6 (six) hours as needed., Disp: , Rfl:     medroxyPROGESTERone (DEPO-PROVERA) 150 mg/mL injection, Inject 150 mg into the muscle., Disp: , Rfl:     testosterone 1 % (25 mg/2.5gram) GlPk, APPLY 1ML (FOUR CLICKS) TOPICALLY DAILY AT BEDTIME (ROTATE SITES), Disp: , Rfl:   No current facility-administered medications for this visit.    Review of patient's allergies indicates:   Allergen Reactions    Cat dander Itching and Swelling    Grass pollen-orchardgrass, standard Itching and Swelling     congestion       Past Surgical History:   Procedure Laterality Date    AUGMENTATION OF BREAST      CERVICAL BIOPSY  W/ LOOP ELECTRODE EXCISION      EXCISIONAL HEMORRHOIDECTOMY N/A 9/15/2023    Procedure: HEMORRHOIDECTOMY;  Surgeon: Jamie Romero MD;  Location: [facility] OR;  Service: General;  Laterality: N/A;    INTRAUTERINE DEVICE INSERTION         History reviewed. No pertinent family history.    Social History     Socioeconomic History    Marital  "status: Single   Tobacco Use    Smoking status: Former     Average packs/day: 0.2 packs/day for 21.7 years (3.5 ttl pk-yrs)     Types: Cigarettes     Start date: 01/2002     Passive exposure: Never    Smokeless tobacco: Never   Substance and Sexual Activity    Alcohol use: Yes     Alcohol/week: 3.0 standard drinks of alcohol     Types: 3 Shots of liquor per week     Comment: social 2-3 times per month    Drug use: Never    Sexual activity: Yes     Partners: Male     Birth control/protection: Injection       Physical Exam:  /64   Pulse 86   Ht 5' 7" (1.702 m)   Wt 57.4 kg (126 lb 8.7 oz)   LMP 01/15/2008 Comment: depo shots  BMI 19.82 kg/m²   No results found for this or any previous visit (from the past 24 hour(s)).  APPEARANCE: Well nourished, well developed, in no acute distress.  PSYCH: Appropriate mood and affect.  EXTREMITIES: No edema.     Assessment/Plan:  Encounter for contraceptive management, unspecified type  -     medroxyPROGESTERone (DEPO-PROVERA) injection 150 mg      Follow up in about 3 months (around 12/18/2023) for DEPO. In addition to their scheduled FU, the patient has also been instructed to follow up on as needed basis  All questions were answered and the patient voiced understanding of the above issues.     "

## 2023-12-11 ENCOUNTER — CLINICAL SUPPORT (OUTPATIENT)
Dept: OBSTETRICS AND GYNECOLOGY | Facility: CLINIC | Age: 38
End: 2023-12-11
Payer: MEDICAID

## 2023-12-11 VITALS
BODY MASS INDEX: 19.96 KG/M2 | HEIGHT: 67 IN | OXYGEN SATURATION: 98 % | WEIGHT: 127.19 LBS | SYSTOLIC BLOOD PRESSURE: 122 MMHG | HEART RATE: 101 BPM | RESPIRATION RATE: 18 BRPM | DIASTOLIC BLOOD PRESSURE: 72 MMHG

## 2023-12-11 DIAGNOSIS — Z30.9 ENCOUNTER FOR CONTRACEPTIVE MANAGEMENT, UNSPECIFIED TYPE: Primary | ICD-10-CM

## 2023-12-11 PROCEDURE — 99213 PR OFFICE/OUTPT VISIT, EST, LEVL III, 20-29 MIN: ICD-10-PCS | Mod: ,,, | Performed by: NURSE PRACTITIONER

## 2023-12-11 PROCEDURE — 99213 OFFICE O/P EST LOW 20 MIN: CPT | Mod: ,,, | Performed by: NURSE PRACTITIONER

## 2023-12-11 RX ORDER — FLUOXETINE 10 MG/1
10 CAPSULE ORAL
COMMUNITY
Start: 2023-11-28 | End: 2024-03-04 | Stop reason: ALTCHOICE

## 2023-12-11 RX ORDER — MEDROXYPROGESTERONE ACETATE 150 MG/ML
150 INJECTION, SUSPENSION INTRAMUSCULAR
Status: COMPLETED | OUTPATIENT
Start: 2023-12-11 | End: 2023-12-11

## 2023-12-11 RX ADMIN — MEDROXYPROGESTERONE ACETATE 150 MG: 150 INJECTION, SUSPENSION INTRAMUSCULAR at 10:12

## 2023-12-11 NOTE — PROGRESS NOTES
Chief Complaint:  Injections (No c/o's.)    History of Present Illness:  Noé is a 38 y.o.  presents for depo provera. Last injection was 2023       Review of Systems:  Patient reports no abdominal pain. She reports no hematuria, no abnormal bleeding, no flank pain, no trouble urinating, no incontinence, no rash, no lesion, no discharge, no vaginal odor, and no vaginal itching.     OB History          2    Para   1    Term   1            AB   1    Living   1         SAB        IAB        Ectopic        Multiple        Live Births   1                 Past Medical History:   Diagnosis Date    ADHD     Athlete's heart          Current Outpatient Medications:     cetirizine (ZYRTEC) 10 MG tablet, Take 10 mg by mouth every evening., Disp: , Rfl:     dextroamphetamine-amphetamine 30 mg Tab, Take 1 tablet by mouth 2 (two) times daily., Disp: , Rfl:     FLUoxetine 10 MG capsule, Take 10 mg by mouth., Disp: , Rfl:     medroxyPROGESTERone (DEPO-PROVERA) 150 mg/mL injection, Inject 150 mg into the muscle., Disp: , Rfl:     testosterone 1 % (25 mg/2.5gram) GlPk, APPLY 1ML (FOUR CLICKS) TOPICALLY DAILY AT BEDTIME (ROTATE SITES), Disp: , Rfl:     HYDROcodone-acetaminophen (NORCO) 5-325 mg per tablet, Take 1 tablet by mouth every 6 (six) hours as needed., Disp: , Rfl:   No current facility-administered medications for this visit.    Review of patient's allergies indicates:   Allergen Reactions    Cat dander Itching and Swelling    Grass pollen-orchardgrass, standard Itching and Swelling     congestion       Past Surgical History:   Procedure Laterality Date    AUGMENTATION OF BREAST      CERVICAL BIOPSY  W/ LOOP ELECTRODE EXCISION      EXCISIONAL HEMORRHOIDECTOMY N/A 9/15/2023    Procedure: HEMORRHOIDECTOMY;  Surgeon: Jamie Romero MD;  Location: [facility] OR;  Service: General;  Laterality: N/A;    INTRAUTERINE DEVICE INSERTION         History reviewed. No pertinent family history.    Social History  "    Socioeconomic History    Marital status: Single   Tobacco Use    Smoking status: Former     Average packs/day: 0.2 packs/day for 21.9 years (3.5 ttl pk-yrs)     Types: Cigarettes     Start date: 01/2002     Passive exposure: Never    Smokeless tobacco: Never   Substance and Sexual Activity    Alcohol use: Yes     Alcohol/week: 3.0 standard drinks of alcohol     Types: 3 Shots of liquor per week     Comment: social 2-3 times per month    Drug use: Never    Sexual activity: Yes     Partners: Male     Birth control/protection: Injection       Physical Exam:  /72 (BP Location: Right arm)   Pulse 101   Resp 18   Ht 5' 7" (1.702 m)   Wt 57.7 kg (127 lb 3.2 oz)   SpO2 98%   BMI 19.92 kg/m²   No results found for this or any previous visit (from the past 24 hour(s)).  APPEARANCE: Well nourished, well developed, in no acute distress.  PSYCH: Appropriate mood and affect.  EXTREMITIES: No edema.       Assessment/Plan:  Encounter for contraceptive management, unspecified type  -     medroxyPROGESTERone (DEPO-PROVERA) injection 150 mg      No follow-ups on file. In addition to their scheduled FU, the patient has also been instructed to follow up on as needed basis  All questions were answered and the patient voiced understanding of the above issues.      "

## 2024-03-04 ENCOUNTER — CLINICAL SUPPORT (OUTPATIENT)
Dept: OBSTETRICS AND GYNECOLOGY | Facility: CLINIC | Age: 39
End: 2024-03-04
Payer: MEDICAID

## 2024-03-04 VITALS
WEIGHT: 131 LBS | HEART RATE: 93 BPM | DIASTOLIC BLOOD PRESSURE: 60 MMHG | BODY MASS INDEX: 20.52 KG/M2 | SYSTOLIC BLOOD PRESSURE: 120 MMHG

## 2024-03-04 DIAGNOSIS — Z30.9 ENCOUNTER FOR CONTRACEPTIVE MANAGEMENT, UNSPECIFIED TYPE: Primary | ICD-10-CM

## 2024-03-04 PROCEDURE — 99213 OFFICE O/P EST LOW 20 MIN: CPT | Mod: ,,, | Performed by: NURSE PRACTITIONER

## 2024-03-04 RX ORDER — ESTRADIOL 0.1 MG/G
1 CREAM VAGINAL DAILY
Qty: 42.5 G | Refills: 5 | Status: SHIPPED | OUTPATIENT
Start: 2024-03-04

## 2024-03-04 RX ORDER — MEDROXYPROGESTERONE ACETATE 150 MG/ML
150 INJECTION, SUSPENSION INTRAMUSCULAR
Status: COMPLETED | OUTPATIENT
Start: 2024-03-04 | End: 2024-03-04

## 2024-03-04 RX ORDER — CHLOPHEDIANOL HCL AND PYRILAMINE MALEATE 12.5; 12.5 MG/5ML; MG/5ML
SOLUTION ORAL
COMMUNITY
Start: 2024-01-07 | End: 2024-05-28

## 2024-03-04 RX ADMIN — MEDROXYPROGESTERONE ACETATE 150 MG: 150 INJECTION, SUSPENSION INTRAMUSCULAR at 09:03

## 2024-03-04 NOTE — PROGRESS NOTES
Chief Complaint:  Contraception (NO C/O'S.)    History of Present Illness:  Noé is a 38 y.o.  presents for depo provera. Last injection was 2023     Pt reports she needs refill on estrace  Review of Systems:  Patient reports no abdominal pain. She reports no hematuria, no abnormal bleeding, no flank pain, no trouble urinating, no incontinence, no rash, no lesion, no discharge, no vaginal odor, and no vaginal itching.     OB History          2    Para   1    Term   1            AB   1    Living   1         SAB        IAB        Ectopic        Multiple        Live Births   1                 Past Medical History:   Diagnosis Date    ADHD     Athlete's heart          Current Outpatient Medications:     cetirizine (ZYRTEC) 10 MG tablet, Take 10 mg by mouth every evening., Disp: , Rfl:     dextroamphetamine-amphetamine 30 mg Tab, Take 1 tablet by mouth 2 (two) times daily., Disp: , Rfl:     medroxyPROGESTERone (DEPO-PROVERA) 150 mg/mL injection, Inject 150 mg into the muscle., Disp: , Rfl:     NINJACOF 12.5-12.5 mg/5 mL Liqd, Take by mouth., Disp: , Rfl:     estradioL (ESTRACE) 0.01 % (0.1 mg/gram) vaginal cream, Place 1 g vaginally once daily., Disp: 42.5 g, Rfl: 5    testosterone 1 % (25 mg/2.5gram) GlPk, APPLY 1ML (FOUR CLICKS) TOPICALLY DAILY AT BEDTIME (ROTATE SITES), Disp: , Rfl:   No current facility-administered medications for this visit.    Review of patient's allergies indicates:   Allergen Reactions    Cat dander Itching and Swelling    Grass pollen-orchardgrass, standard Itching and Swelling     congestion       Past Surgical History:   Procedure Laterality Date    AUGMENTATION OF BREAST      CERVICAL BIOPSY  W/ LOOP ELECTRODE EXCISION      EXCISIONAL HEMORRHOIDECTOMY N/A 9/15/2023    Procedure: HEMORRHOIDECTOMY;  Surgeon: Jamie Romero MD;  Location: [facility];  Service: General;  Laterality: N/A;    INTRAUTERINE DEVICE INSERTION         History reviewed. No pertinent family  history.    Social History     Socioeconomic History    Marital status: Single   Tobacco Use    Smoking status: Former     Average packs/day: 0.2 packs/day for 22.2 years (3.5 ttl pk-yrs)     Types: Cigarettes     Start date: 01/2002     Passive exposure: Never    Smokeless tobacco: Never   Substance and Sexual Activity    Alcohol use: Yes     Alcohol/week: 3.0 standard drinks of alcohol     Types: 3 Shots of liquor per week     Comment: social 2-3 times per month    Drug use: Never    Sexual activity: Yes     Partners: Male     Birth control/protection: Injection       Physical Exam:  /60   Pulse 93   Wt 59.4 kg (131 lb)   BMI 20.52 kg/m²   No results found for this or any previous visit (from the past 24 hour(s)).  APPEARANCE: Well nourished, well developed, in no acute distress.  PSYCH: Appropriate mood and affect.  EXTREMITIES: No edema.       Assessment/Plan:  Encounter for contraceptive management, unspecified type  -     medroxyPROGESTERone (DEPO-PROVERA) injection 150 mg    Other orders  -     estradioL (ESTRACE) 0.01 % (0.1 mg/gram) vaginal cream; Place 1 g vaginally once daily.  Dispense: 42.5 g; Refill: 5      Follow up in about 3 months (around 6/4/2024) for DEPO. In addition to their scheduled FU, the patient has also been instructed to follow up on as needed basis  All questions were answered and the patient voiced understanding of the above issues.

## 2024-03-26 ENCOUNTER — TELEPHONE (OUTPATIENT)
Dept: OBSTETRICS AND GYNECOLOGY | Facility: CLINIC | Age: 39
End: 2024-03-26
Payer: MEDICAID

## 2024-03-26 NOTE — TELEPHONE ENCOUNTER
Dr alcantara aware will sent script.----- Message from Melanie Carter MA sent at 3/26/2024  8:45 AM CDT -----  Pt called needs refill on testosterone cream, had appt on 03/04/2024 where it was suppose to be sent to pharmacy.     [address] [address]  Call back #   [phone]

## 2024-03-27 ENCOUNTER — TELEPHONE (OUTPATIENT)
Dept: OBSTETRICS AND GYNECOLOGY | Facility: CLINIC | Age: 39
End: 2024-03-27
Payer: MEDICAID

## 2024-03-27 NOTE — TELEPHONE ENCOUNTER
Testosterone cream 4 mg per ml.topically. apply one click (1 ml) at bedtime with 5 rf called to [facility]'s pharmacy in [place] as ordered per Dr. Valdovinos.

## 2024-05-28 ENCOUNTER — CLINICAL SUPPORT (OUTPATIENT)
Dept: OBSTETRICS AND GYNECOLOGY | Facility: CLINIC | Age: 39
End: 2024-05-28
Payer: MEDICAID

## 2024-05-28 VITALS
SYSTOLIC BLOOD PRESSURE: 110 MMHG | DIASTOLIC BLOOD PRESSURE: 62 MMHG | OXYGEN SATURATION: 98 % | HEART RATE: 95 BPM | HEIGHT: 67 IN | WEIGHT: 131.38 LBS | BODY MASS INDEX: 20.62 KG/M2 | RESPIRATION RATE: 18 BRPM

## 2024-05-28 DIAGNOSIS — Z30.9 ENCOUNTER FOR CONTRACEPTIVE MANAGEMENT, UNSPECIFIED TYPE: Primary | ICD-10-CM

## 2024-05-28 PROCEDURE — 99213 OFFICE O/P EST LOW 20 MIN: CPT | Mod: ,,, | Performed by: OBSTETRICS & GYNECOLOGY

## 2024-05-28 RX ORDER — MEDROXYPROGESTERONE ACETATE 150 MG/ML
150 INJECTION, SUSPENSION INTRAMUSCULAR
Status: COMPLETED | OUTPATIENT
Start: 2024-05-28 | End: 2024-05-28

## 2024-05-28 RX ADMIN — MEDROXYPROGESTERONE ACETATE 150 MG: 150 INJECTION, SUSPENSION INTRAMUSCULAR at 09:05

## 2024-05-28 NOTE — PROGRESS NOTES
Chief Complaint:  Injections (No c/o's. )    History of Present Illness:  Noé is a 38 y.o.  presents for depo provera. Last injection was 2024    No LMP recorded. Patient has had an injection.  Denies complaints, all is well.     Review of Systems:  Patient reports no abdominal pain. She reports no hematuria, no abnormal bleeding, no flank pain, no trouble urinating, no incontinence, no rash, no lesion, no discharge, no vaginal odor, and no vaginal itching.     OB History          2    Para   1    Term   1            AB   1    Living   1         SAB        IAB        Ectopic        Multiple        Live Births   1                 Past Medical History:   Diagnosis Date    ADHD     Athlete's heart          Current Outpatient Medications:     cetirizine (ZYRTEC) 10 MG tablet, Take 10 mg by mouth every evening., Disp: , Rfl:     dextroamphetamine-amphetamine 30 mg Tab, Take 1 tablet by mouth 2 (two) times daily., Disp: , Rfl:     estradioL (ESTRACE) 0.01 % (0.1 mg/gram) vaginal cream, Place 1 g vaginally once daily., Disp: 42.5 g, Rfl: 5    medroxyPROGESTERone (DEPO-PROVERA) 150 mg/mL injection, Inject 150 mg into the muscle., Disp: , Rfl:     testosterone 1 % (25 mg/2.5gram) GlPk, APPLY 1ML (FOUR CLICKS) TOPICALLY DAILY AT BEDTIME (ROTATE SITES), Disp: , Rfl:   No current facility-administered medications for this visit.    Review of patient's allergies indicates:   Allergen Reactions    Cat dander Itching and Swelling    Grass pollen-orchardgrass, standard Itching and Swelling     congestion       Past Surgical History:   Procedure Laterality Date    AUGMENTATION OF BREAST      CERVICAL BIOPSY  W/ LOOP ELECTRODE EXCISION      EXCISIONAL HEMORRHOIDECTOMY N/A 09/15/2023    Procedure: HEMORRHOIDECTOMY;  Surgeon: Jamie Romero MD;  Location: [facility] OR;  Service: General;  Laterality: N/A;    INTRAUTERINE DEVICE INSERTION      REMOVAL OF INTRAUTERINE DEVICE (IUD)         No family  "history on file.    Social History     Socioeconomic History    Marital status: Single   Tobacco Use    Smoking status: Former     Average packs/day: 0.5 packs/day for 7.0 years (3.5 ttl pk-yrs)     Types: Cigarettes     Start date: 01/2002     Passive exposure: Past    Smokeless tobacco: Never   Substance and Sexual Activity    Alcohol use: Yes     Alcohol/week: 3.0 standard drinks of alcohol     Types: 3 Shots of liquor per week     Comment: social 2-3 times per month    Drug use: Never    Sexual activity: Yes     Partners: Male     Birth control/protection: Injection       Physical Exam:  /62 (BP Location: Left arm)   Pulse 95   Resp 18   Ht 5' 7" (1.702 m)   Wt 59.6 kg (131 lb 6.4 oz)   SpO2 98%   BMI 20.58 kg/m²   No results found for this or any previous visit (from the past 24 hour(s)).  APPEARANCE: Well nourished, well developed, in no acute distress.  PSYCH: Appropriate mood and affect.  EXTREMITIES: No edema.     Assessment/Plan:  Encounter for contraceptive management, unspecified type  -     medroxyPROGESTERone (DEPO-PROVERA) injection 150 mg      Follow up in about 3 months (around 8/28/2024) for ANNUAL, DEPO. In addition to their scheduled FU, the patient has also been instructed to follow up on as needed basis  All questions were answered and the patient voiced understanding of the above issues.      "

## 2024-06-11 ENCOUNTER — OFFICE VISIT (OUTPATIENT)
Dept: OBSTETRICS AND GYNECOLOGY | Facility: CLINIC | Age: 39
End: 2024-06-11
Payer: MEDICAID

## 2024-06-11 VITALS
SYSTOLIC BLOOD PRESSURE: 104 MMHG | HEART RATE: 60 BPM | WEIGHT: 129.19 LBS | DIASTOLIC BLOOD PRESSURE: 80 MMHG | BODY MASS INDEX: 20.24 KG/M2

## 2024-06-11 DIAGNOSIS — N89.8 VAGINAL ODOR: ICD-10-CM

## 2024-06-11 DIAGNOSIS — N89.8 VAGINAL DISCHARGE: Primary | ICD-10-CM

## 2024-06-11 PROCEDURE — 3079F DIAST BP 80-89 MM HG: CPT | Mod: CPTII,,, | Performed by: NURSE PRACTITIONER

## 2024-06-11 PROCEDURE — 3074F SYST BP LT 130 MM HG: CPT | Mod: CPTII,,, | Performed by: NURSE PRACTITIONER

## 2024-06-11 PROCEDURE — 1160F RVW MEDS BY RX/DR IN RCRD: CPT | Mod: CPTII,,, | Performed by: NURSE PRACTITIONER

## 2024-06-11 PROCEDURE — 99213 OFFICE O/P EST LOW 20 MIN: CPT | Mod: ,,, | Performed by: NURSE PRACTITIONER

## 2024-06-11 PROCEDURE — 1159F MED LIST DOCD IN RCRD: CPT | Mod: CPTII,,, | Performed by: NURSE PRACTITIONER

## 2024-06-11 PROCEDURE — 3008F BODY MASS INDEX DOCD: CPT | Mod: CPTII,,, | Performed by: NURSE PRACTITIONER

## 2024-06-11 RX ORDER — METRONIDAZOLE 500 MG/1
500 TABLET ORAL EVERY 12 HOURS
Qty: 14 TABLET | Refills: 0 | Status: SHIPPED | OUTPATIENT
Start: 2024-06-11 | End: 2024-06-18

## 2024-06-11 NOTE — PROGRESS NOTES
Patient ID: [id number]   Chief Complaint: VAGINAL ODOR (PRESENTS TO CLINIC FOR VAGINAL ODOR FOR X 2 WEEKS AND A DISCHARGE FOR ONE WEEK. )    HPI:   Noé Raines is a 38 y.o.  here today for VAGINAL ODOR (PRESENTS TO CLINIC FOR VAGINAL ODOR FOR X 2 WEEKS AND A DISCHARGE FOR ONE WEEK. )   No LMP recorded (lmp unknown). Patient has had an injection.  Past Medical History:  has a past medical history of ADHD and Athlete's heart.  Surgical History:  has a past surgical history that includes Cervical biopsy w/ loop electrode excision; Augmentation of breast; Intrauterine device insertion; Excisional hemorrhoidectomy (N/A, 09/15/2023); and Removal of intrauterine device (IUD) ().  Family History: family history is not on file.  Social History:  reports that she has quit smoking. Her smoking use included cigarettes. She started smoking about 22 years ago. She has a 3.5 pack-year smoking history. She has been exposed to tobacco smoke. She has never used smokeless tobacco. She reports current alcohol use of about 3.0 standard drinks of alcohol per week. She reports that she does not use drugs.  Current Outpatient Medications   Medication Sig Dispense Refill    cetirizine (ZYRTEC) 10 MG tablet Take 10 mg by mouth every evening.      dextroamphetamine-amphetamine 30 mg Tab Take 1 tablet by mouth 2 (two) times daily.      estradioL (ESTRACE) 0.01 % (0.1 mg/gram) vaginal cream Place 1 g vaginally once daily. 42.5 g 5    medroxyPROGESTERone (DEPO-PROVERA) 150 mg/mL injection Inject 150 mg into the muscle.      testosterone 1 % (25 mg/2.5gram) GlPk APPLY 1ML (FOUR CLICKS) TOPICALLY DAILY AT BEDTIME (ROTATE SITES)      metroNIDAZOLE (FLAGYL) 500 MG tablet Take 1 tablet (500 mg total) by mouth every 12 (twelve) hours. for 7 days 14 tablet 0     No current facility-administered medications for this visit.     Patient is allergic to cat dander and grass pollen-orchardgrass, standard.  MENARCHEAL:  ON DEPO, DOESN'T  CYCLE  PAP:  Last PAP: 6/30/2023    History of Abnormal PAP Smear: YES: ONE TIME     Treated: LEEP  HPV Vaccine: NO  INTERCOURSE:  Dyspareunia: No  Postcoital Bleeding: No  History of STI: No  Current Birth Control Method: Depo-Provera injections  Sexually Active: yes        No results found for this or any previous visit (from the past 24 hour(s)).    Subjective:   Review of Systems  12 point review of systems conducted, negative except as stated in the history of present illness. See HPI for details.  Objective:     Visit Vitals  /80   Pulse 60   Wt 58.6 kg (129 lb 3.2 oz)   LMP  (LMP Unknown)   BMI 20.24 kg/m²     No results found for this or any previous visit (from the past 24 hour(s)).  Physical Exam  Constitutional:  General Appearance : alert, in no acute distress, normal, well nourished.  Respiratory:  Respiratory Effort: normal.  Gastrointestinal:  Abdomen: no masses. no tender, nondistended.  Liver and spleen: normal  Hernias: no hernias present, no inguinal adenopathy.  Genitourinary:  External Genitalia: normal, no lesions.  Vagina: normal appearance, no abnormal discharge, no lesions.  Bladder: no mass, nontender.  Urethra: no erythema or lesions present.  Cervix: no lesions, non tender.   Uterus: nontender, normal contour, normal mobility, normal size.   Adnexa: no masses, no tenderness.  Anus and Perineum: visually normal.   Chaperone Present  Assessment:       ICD-10-CM ICD-9-CM   1. Vaginal discharge  N89.8 623.5   2. Vaginal odor  N89.8 625.8     Plan   Vaginal discharge  -     MDL Sendout Test  -     metroNIDAZOLE (FLAGYL) 500 MG tablet; Take 1 tablet (500 mg total) by mouth every 12 (twelve) hours. for 7 days  Dispense: 14 tablet; Refill: 0    Vaginal odor  -     MDL Sendout Test  -     metroNIDAZOLE (FLAGYL) 500 MG tablet; Take 1 tablet (500 mg total) by mouth every 12 (twelve) hours. for 7 days  Dispense: 14 tablet; Refill: 0    No follow-ups on file. In addition to their scheduled  follow up, the patient has also been instructed to follow up on as needed basis.   GREGG Mendes

## 2024-08-20 ENCOUNTER — OFFICE VISIT (OUTPATIENT)
Dept: OBSTETRICS AND GYNECOLOGY | Facility: CLINIC | Age: 39
End: 2024-08-20
Payer: MEDICAID

## 2024-08-20 VITALS
SYSTOLIC BLOOD PRESSURE: 118 MMHG | HEART RATE: 94 BPM | DIASTOLIC BLOOD PRESSURE: 74 MMHG | WEIGHT: 135.19 LBS | BODY MASS INDEX: 21.18 KG/M2

## 2024-08-20 DIAGNOSIS — Z30.9 ENCOUNTER FOR CONTRACEPTIVE MANAGEMENT, UNSPECIFIED TYPE: ICD-10-CM

## 2024-08-20 DIAGNOSIS — Z11.3 SCREENING FOR STDS (SEXUALLY TRANSMITTED DISEASES): ICD-10-CM

## 2024-08-20 DIAGNOSIS — Z01.419 ROUTINE GYNECOLOGICAL EXAMINATION: Primary | ICD-10-CM

## 2024-08-20 LAB
B-HCG UR QL: NEGATIVE
CTP QC/QA: YES
HBV SURFACE AG SERPL QL IA: NEGATIVE
HBV SURFACE AG SERPL QL IA: NORMAL
HCV AB SERPL QL IA: NONREACTIVE
HIV 1+2 AB+HIV1 P24 AG SERPL QL IA: NONREACTIVE
T PALLIDUM AB SER QL: NONREACTIVE

## 2024-08-20 PROCEDURE — 86803 HEPATITIS C AB TEST: CPT | Performed by: NURSE PRACTITIONER

## 2024-08-20 PROCEDURE — 87340 HEPATITIS B SURFACE AG IA: CPT | Performed by: NURSE PRACTITIONER

## 2024-08-20 PROCEDURE — 87661 TRICHOMONAS VAGINALIS AMPLIF: CPT | Performed by: NURSE PRACTITIONER

## 2024-08-20 PROCEDURE — 87389 HIV-1 AG W/HIV-1&-2 AB AG IA: CPT | Performed by: NURSE PRACTITIONER

## 2024-08-20 PROCEDURE — 87491 CHLMYD TRACH DNA AMP PROBE: CPT | Performed by: NURSE PRACTITIONER

## 2024-08-20 PROCEDURE — 87591 N.GONORRHOEAE DNA AMP PROB: CPT | Performed by: NURSE PRACTITIONER

## 2024-08-20 PROCEDURE — 86780 TREPONEMA PALLIDUM: CPT | Performed by: NURSE PRACTITIONER

## 2024-08-20 RX ORDER — MEDROXYPROGESTERONE ACETATE 150 MG/ML
150 INJECTION, SUSPENSION INTRAMUSCULAR ONCE
Status: COMPLETED | OUTPATIENT
Start: 2024-08-20 | End: 2024-08-20

## 2024-08-20 RX ADMIN — MEDROXYPROGESTERONE ACETATE 150 MG: 150 INJECTION, SUSPENSION INTRAMUSCULAR at 09:08

## 2024-08-20 NOTE — PROGRESS NOTES
Patient ID: [id number]   Chief Complaint: Annual exam  Chief Complaint   Patient presents with    Well Woman     Presents to clinic for well woman exam and depo injection, denies complaints. Last depo given on 2024. Would like STD testing done; Chlamydia, gonorrhea, trich, syphilis and also testosterone level checked. Patient denies known exposure to STD but would like to be checked     HPI:   Noé Raines is a 39 y.o. year old  here for her Annual Exam and Depo. Last Depo injection was 2024. Pt No LMP recorded (lmp unknown). Patient has had an injection. She is doing well. Denies any health changes. Well Woman (Presents to clinic for well woman exam and depo injection, denies complaints. Last depo given on 2024. Would like STD testing done; Chlamydia, gonorrhea, trich, syphilis and also testosterone level checked. Patient denies known exposure to STD but would like to be checked)  Pt would like to increase Testosterone cream.  Subjective:     Past Medical History:   Diagnosis Date    ADHD     Athlete's heart      Past Surgical History:   Procedure Laterality Date    AUGMENTATION OF BREAST      CERVICAL BIOPSY  W/ LOOP ELECTRODE EXCISION      COSMETIC SURGERY  2021    Breast implants    EXCISIONAL HEMORRHOIDECTOMY N/A 09/15/2023    Procedure: HEMORRHOIDECTOMY;  Surgeon: Jamie Romero MD;  Location: [facility];  Service: General;  Laterality: N/A;    INTRAUTERINE DEVICE INSERTION      REMOVAL OF INTRAUTERINE DEVICE (IUD)       Social History     Tobacco Use    Smoking status: Former     Average packs/day: 0.5 packs/day for 7.0 years (3.5 ttl pk-yrs)     Types: Cigarettes     Start date: 2002     Passive exposure: Past    Smokeless tobacco: Never   Substance Use Topics    Alcohol use: Yes     Alcohol/week: 3.0 standard drinks of alcohol     Types: 3 Drinks containing 0.5 oz of alcohol per week     Comment: social 2-3 times per month    Drug use: Never     Family History    Problem Relation Name Age of Onset    Ovarian cancer Mother Rebekah      OB History    Para Term  AB Living   2 1 1   1 1   SAB IAB Ectopic Multiple Live Births           1      # Outcome Date GA Lbr Arpan/2nd Weight Sex Type Anes PTL Lv   2 AB  11w0d          1 Term 10/21/09 40w0d   M Vag-Spont EPI  SUZANNE       Current Outpatient Medications:     cetirizine (ZYRTEC) 10 MG tablet, Take 10 mg by mouth every evening., Disp: , Rfl:     dextroamphetamine-amphetamine 30 mg Tab, Take 1 tablet by mouth 2 (two) times daily., Disp: , Rfl:     estradioL (ESTRACE) 0.01 % (0.1 mg/gram) vaginal cream, Place 1 g vaginally once daily., Disp: 42.5 g, Rfl: 5    medroxyPROGESTERone (DEPO-PROVERA) 150 mg/mL injection, Inject 150 mg into the muscle., Disp: , Rfl:     testosterone 1 % (25 mg/2.5gram) GlPk, APPLY 1ML (FOUR CLICKS) TOPICALLY DAILY AT BEDTIME (ROTATE SITES), Disp: , Rfl:   No current facility-administered medications for this visit.  MENARCHEAL:  Doesn't cycle, on depo provera  PAP:  Last PAP: 2023    History of Abnormal PAP Smear: YES: approx over 10 years ago      Treated: LEEP  HPV Vaccine: NO  INTERCOURSE:  Dyspareunia: No  Postcoital Bleeding: No  History of STI: No  Current Birth Control Method: Depo-Provera injections  Sexually Active: yes  COLONOSCOPY:  Last Colonoscopy: approx 1 year ago- normal    Review of Systems 12 point review of systems conducted, negative except as stated in the history of present illness. See HPI for details.  Objective:   Visit Vitals  /74   Pulse 94   Wt 61.3 kg (135 lb 3.2 oz)   LMP  (LMP Unknown)   BMI 21.18 kg/m²     Physical Exam:  Physical Exam  Recent Results (from the past 24 hour(s))   POCT Urine Pregnancy    Collection Time: 24  9:46 AM   Result Value Ref Range    POC Preg Test, Ur Negative Negative     Acceptable Yes      Constitutional:  General Appearance : alert, in no acute distress, normal, well  nourished.  Respiratory:  Respiratory Effort: normal.  Breast:  Right: Inspection/palpation: no discharge, no masses present, no nipple retraction, no skin changes, no skin dimpling, no tenderness, no lymphadenopathy, no axillary mass, no axillary tenderness.  Left: Inspection/palpation: no discharge, no masses present, no nipple retraction, no skin changes, no skin dimpling, no tenderness, no lymphadenopathy, no axillary mass, no axillary tenderness.  Gastrointestinal:  Abdomen: no masses. no tender, nondistended.  Liver and spleen: normal  Hernias: no hernias present, no inguinal adenopathy.  Genitourinary:  External Genitalia: normal, no lesions.  Vagina: normal appearance, no abnormal discharge, no lesions.  Bladder: no mass, nontender.  Urethra: no erythema or lesions present.  Cervix: no lesions, non tender. Pap Done STD testing via pap  Uterus: nontender, normal contour, normal mobility, normal size.   Adnexa: no masses, no tenderness.  Anus and Perineum: visually normal.   Chaperone Present  Recent Results (from the past 24 hour(s))   POCT Urine Pregnancy    Collection Time: 08/20/24  9:46 AM   Result Value Ref Range    POC Preg Test, Ur Negative Negative     Acceptable Yes      Assessment/Plan:   Assessment:   Routine gynecological examination  -     Liquid-Based Pap Smear, Screening; Future; Expected date: 08/20/2024    Encounter for contraceptive management, unspecified type  -     medroxyPROGESTERone (DEPO-PROVERA) injection 150 mg  -     POCT Urine Pregnancy; Future; Expected date: 08/20/2024    Screening for STDs (sexually transmitted diseases)  -     HIV 1/2 Ag/Ab (4th Gen); Future; Expected date: 08/20/2024  -     SYPHILIS ANTIBODY (WITH REFLEX RPR); Future; Expected date: 08/20/2024  -     Hepatitis C Antibody; Future; Expected date: 08/20/2024  -     Hepatitis B Surface Antigen; Future; Expected date: 08/20/2024    Increase testosterone cream 7.5mg  No follow-ups on file. In addition  to their scheduled FU, the patient has also been instructed to follow up on as needed basis. All questions were answered and the patient voiced understanding of the above issues.

## 2024-08-23 LAB
CHLAMYDIA TRACHOMATIS: NEGATIVE
NEISSERIA GONORRHOEAE: NEGATIVE
PSYCHE PATHOLOGY RESULT: NORMAL
TRICHOMONAS VAGINALIS: NEGATIVE

## 2024-11-12 ENCOUNTER — CLINICAL SUPPORT (OUTPATIENT)
Dept: OBSTETRICS AND GYNECOLOGY | Facility: CLINIC | Age: 39
End: 2024-11-12
Payer: MEDICAID

## 2024-11-12 VITALS
SYSTOLIC BLOOD PRESSURE: 110 MMHG | WEIGHT: 130 LBS | DIASTOLIC BLOOD PRESSURE: 60 MMHG | BODY MASS INDEX: 20.36 KG/M2 | HEART RATE: 90 BPM

## 2024-11-12 DIAGNOSIS — Z30.42 ENCOUNTER FOR DEPO-PROVERA CONTRACEPTION: Primary | ICD-10-CM

## 2024-11-12 LAB
B-HCG UR QL: NEGATIVE
CTP QC/QA: YES

## 2024-11-12 PROCEDURE — 81025 URINE PREGNANCY TEST: CPT | Mod: ,,, | Performed by: NURSE PRACTITIONER

## 2024-11-12 PROCEDURE — 99213 OFFICE O/P EST LOW 20 MIN: CPT | Mod: ,,, | Performed by: NURSE PRACTITIONER

## 2024-11-12 RX ORDER — MEDROXYPROGESTERONE ACETATE 150 MG/ML
150 INJECTION, SUSPENSION INTRAMUSCULAR
Status: COMPLETED | OUTPATIENT
Start: 2024-11-12 | End: 2024-11-12

## 2024-11-12 RX ADMIN — MEDROXYPROGESTERONE ACETATE 150 MG: 150 INJECTION, SUSPENSION INTRAMUSCULAR at 09:11

## 2024-11-12 NOTE — PROGRESS NOTES
Chief Complaint:  Contraception (NO C/O'S.)    History of Present Illness:  Noé is a 39 y.o.  presents for depo provera. Last injection was 2024    No LMP recorded.    Review of Systems:  Patient reports no abdominal pain. She reports no hematuria, no abnormal bleeding, no flank pain, no trouble urinating, no incontinence, no rash, no lesion, no discharge, no vaginal odor, and no vaginal itching.     OB History          2    Para   1    Term   1            AB   1    Living   1         SAB        IAB        Ectopic        Multiple        Live Births   1                 Past Medical History:   Diagnosis Date    ADHD     Athlete's heart          Current Outpatient Medications:     cetirizine (ZYRTEC) 10 MG tablet, Take 10 mg by mouth every evening., Disp: , Rfl:     dextroamphetamine-amphetamine 30 mg Tab, Take 1 tablet by mouth 2 (two) times daily., Disp: , Rfl:     estradioL (ESTRACE) 0.01 % (0.1 mg/gram) vaginal cream, Place 1 g vaginally once daily., Disp: 42.5 g, Rfl: 5    medroxyPROGESTERone (DEPO-PROVERA) 150 mg/mL injection, Inject 150 mg into the muscle., Disp: , Rfl:     testosterone 1 % (25 mg/2.5gram) GlPk, APPLY 1ML (FOUR CLICKS) TOPICALLY DAILY AT BEDTIME (ROTATE SITES), Disp: , Rfl:   No current facility-administered medications for this visit.    Review of patient's allergies indicates:   Allergen Reactions    Cat dander Itching and Swelling    Grass pollen-orchardgrass, standard Itching and Swelling     congestion       Past Surgical History:   Procedure Laterality Date    AUGMENTATION OF BREAST      CERVICAL BIOPSY  W/ LOOP ELECTRODE EXCISION      COSMETIC SURGERY  2021    Breast implants    EXCISIONAL HEMORRHOIDECTOMY N/A 09/15/2023    Procedure: HEMORRHOIDECTOMY;  Surgeon: Jamie Romero MD;  Location: [facility] OR;  Service: General;  Laterality: N/A;    INTRAUTERINE DEVICE INSERTION      REMOVAL OF INTRAUTERINE DEVICE (IUD)         Family History    Problem Relation Name Age of Onset    Ovarian cancer Mother Rebekah        Social History     Socioeconomic History    Marital status: Single   Tobacco Use    Smoking status: Former     Average packs/day: 0.5 packs/day for 7.0 years (3.5 ttl pk-yrs)     Types: Cigarettes     Start date: 01/2002     Passive exposure: Past    Smokeless tobacco: Never   Substance and Sexual Activity    Alcohol use: Yes     Alcohol/week: 3.0 standard drinks of alcohol     Types: 3 Drinks containing 0.5 oz of alcohol per week     Comment: social 2-3 times per month    Drug use: Never    Sexual activity: Yes     Partners: Male     Birth control/protection: Injection       Physical Exam:  /60   Pulse 90   Wt 59 kg (130 lb)   BMI 20.36 kg/m²   Recent Results (from the past 24 hours)   POCT Urine Pregnancy    Collection Time: 11/12/24  9:12 AM   Result Value Ref Range    POC Preg Test, Ur Negative Negative     Acceptable Yes      APPEARANCE: Well nourished, well developed, in no acute distress.  PSYCH: Appropriate mood and affect.  EXTREMITIES: No edema.       Assessment/Plan:  Encounter for Depo-Provera contraception  -     POCT Urine Pregnancy  -     medroxyPROGESTERone (DEPO-PROVERA) injection 150 mg      Follow up in about 3 months (around 2/12/2025) for DEPO. In addition to their scheduled FU, the patient has also been instructed to follow up on as needed basis  All questions were answered and the patient voiced understanding of the above issues.

## 2025-02-03 ENCOUNTER — CLINICAL SUPPORT (OUTPATIENT)
Dept: OBSTETRICS AND GYNECOLOGY | Facility: CLINIC | Age: 40
End: 2025-02-03
Payer: MEDICAID

## 2025-02-03 VITALS
BODY MASS INDEX: 21.3 KG/M2 | DIASTOLIC BLOOD PRESSURE: 68 MMHG | HEART RATE: 89 BPM | SYSTOLIC BLOOD PRESSURE: 106 MMHG | WEIGHT: 136 LBS

## 2025-02-03 DIAGNOSIS — Z30.42 ENCOUNTER FOR DEPO-PROVERA CONTRACEPTION: Primary | ICD-10-CM

## 2025-02-03 LAB
B-HCG UR QL: NEGATIVE
CTP QC/QA: YES

## 2025-02-03 PROCEDURE — 99213 OFFICE O/P EST LOW 20 MIN: CPT | Mod: ,,, | Performed by: NURSE PRACTITIONER

## 2025-02-03 PROCEDURE — 81025 URINE PREGNANCY TEST: CPT | Mod: ,,, | Performed by: NURSE PRACTITIONER

## 2025-02-03 RX ORDER — KETOCONAZOLE 20 MG/ML
SHAMPOO, SUSPENSION TOPICAL 2 TIMES DAILY PRN
COMMUNITY
Start: 2024-12-12

## 2025-02-03 RX ORDER — MEDROXYPROGESTERONE ACETATE 150 MG/ML
150 INJECTION, SUSPENSION INTRAMUSCULAR
Status: COMPLETED | OUTPATIENT
Start: 2025-02-03 | End: 2025-02-03

## 2025-02-03 RX ADMIN — MEDROXYPROGESTERONE ACETATE 150 MG: 150 INJECTION, SUSPENSION INTRAMUSCULAR at 08:02

## 2025-02-03 NOTE — PROGRESS NOTES
Chief Complaint:  Constipation (NO C/O'S.)    History of Present Illness:  Noé is a 39 y.o.  presents for depo provera. Last injection was 2024    No LMP recorded.    Review of Systems:  Patient reports no abdominal pain. She reports no hematuria, no abnormal bleeding, no flank pain, no trouble urinating, no incontinence, no rash, no lesion, no discharge, no vaginal odor, and no vaginal itching.     OB History          2    Para   1    Term   1            AB   1    Living   1         SAB        IAB        Ectopic        Multiple        Live Births   1                 Past Medical History:   Diagnosis Date    ADHD     Athlete's heart          Current Outpatient Medications:     cetirizine (ZYRTEC) 10 MG tablet, Take 10 mg by mouth every evening., Disp: , Rfl:     dextroamphetamine-amphetamine 30 mg Tab, Take 1 tablet by mouth 2 (two) times daily., Disp: , Rfl:     estradioL (ESTRACE) 0.01 % (0.1 mg/gram) vaginal cream, Place 1 g vaginally once daily., Disp: 42.5 g, Rfl: 5    ketoconazole (NIZORAL) 2 % shampoo, Apply topically 2 (two) times daily as needed., Disp: , Rfl:     medroxyPROGESTERone (DEPO-PROVERA) 150 mg/mL injection, Inject 150 mg into the muscle., Disp: , Rfl:     testosterone 1 % (25 mg/2.5gram) GlPk, APPLY 1ML (FOUR CLICKS) TOPICALLY DAILY AT BEDTIME (ROTATE SITES), Disp: , Rfl:     Current Facility-Administered Medications:     medroxyPROGESTERone (DEPO-PROVERA) injection 150 mg, 150 mg, Intramuscular, 1 time in Clinic/HOD, Mecca Gardner WHNP    Review of patient's allergies indicates:   Allergen Reactions    Cat dander Itching and Swelling    Grass pollen-orchardgrass, standard Itching and Swelling     congestion       Past Surgical History:   Procedure Laterality Date    AUGMENTATION OF BREAST      CERVICAL BIOPSY  W/ LOOP ELECTRODE EXCISION      COSMETIC SURGERY  2021    Breast implants    EXCISIONAL HEMORRHOIDECTOMY N/A 09/15/2023    Procedure:  HEMORRHOIDECTOMY;  Surgeon: Jamie Romero MD;  Location: [facility] OR;  Service: General;  Laterality: N/A;    INTRAUTERINE DEVICE INSERTION      REMOVAL OF INTRAUTERINE DEVICE (IUD)  2022       Family History   Problem Relation Name Age of Onset    Ovarian cancer Mother Rebekah        Social History     Socioeconomic History    Marital status: Single   Tobacco Use    Smoking status: Former     Average packs/day: 0.5 packs/day for 7.0 years (3.5 ttl pk-yrs)     Types: Cigarettes     Start date: 01/2002     Passive exposure: Past    Smokeless tobacco: Never   Substance and Sexual Activity    Alcohol use: Yes     Alcohol/week: 3.0 standard drinks of alcohol     Types: 3 Drinks containing 0.5 oz of alcohol per week     Comment: social 2-3 times per month    Drug use: Never    Sexual activity: Yes     Partners: Male     Birth control/protection: Injection       Physical Exam:  /68   Pulse 89   Wt 61.7 kg (136 lb)   BMI 21.30 kg/m²   No results found for this or any previous visit (from the past 24 hours).  APPEARANCE: Well nourished, well developed, in no acute distress.  PSYCH: Appropriate mood and affect.  EXTREMITIES: No edema.       Assessment/Plan:  Encounter for Depo-Provera contraception  -     medroxyPROGESTERone (DEPO-PROVERA) injection 150 mg  -     POCT Urine Pregnancy      No follow-ups on file. In addition to their scheduled FU, the patient has also been instructed to follow up on as needed basis  All questions were answered and the patient voiced understanding of the above issues.

## 2025-03-29 DIAGNOSIS — N95.2 VAGINAL ATROPHY: Primary | ICD-10-CM

## 2025-03-31 RX ORDER — ESTRADIOL 0.1 MG/G
CREAM VAGINAL
Qty: 42.5 G | Refills: 5 | Status: SHIPPED | OUTPATIENT
Start: 2025-03-31

## 2025-04-28 ENCOUNTER — CLINICAL SUPPORT (OUTPATIENT)
Dept: OBSTETRICS AND GYNECOLOGY | Facility: CLINIC | Age: 40
End: 2025-04-28
Payer: MEDICAID

## 2025-04-28 VITALS
HEART RATE: 72 BPM | WEIGHT: 138.5 LBS | SYSTOLIC BLOOD PRESSURE: 120 MMHG | DIASTOLIC BLOOD PRESSURE: 72 MMHG | BODY MASS INDEX: 21.69 KG/M2

## 2025-04-28 DIAGNOSIS — Z30.42 ENCOUNTER FOR DEPO-PROVERA CONTRACEPTION: Primary | ICD-10-CM

## 2025-04-28 LAB
B-HCG UR QL: NEGATIVE
CTP QC/QA: YES

## 2025-04-28 PROCEDURE — 81025 URINE PREGNANCY TEST: CPT | Mod: ,,, | Performed by: NURSE PRACTITIONER

## 2025-04-28 PROCEDURE — 99213 OFFICE O/P EST LOW 20 MIN: CPT | Mod: ,,, | Performed by: NURSE PRACTITIONER

## 2025-04-28 RX ORDER — MEDROXYPROGESTERONE ACETATE 150 MG/ML
150 INJECTION, SUSPENSION INTRAMUSCULAR
Status: COMPLETED | OUTPATIENT
Start: 2025-04-28 | End: 2025-04-28

## 2025-04-28 RX ORDER — LISDEXAMFETAMINE DIMESYLATE 60 MG/1
60 CAPSULE ORAL EVERY MORNING
COMMUNITY

## 2025-04-28 RX ADMIN — MEDROXYPROGESTERONE ACETATE 150 MG: 150 INJECTION, SUSPENSION INTRAMUSCULAR at 09:04

## 2025-04-28 NOTE — PROGRESS NOTES
Chief Complaint:  Contraception (DENIES COMPLAINTS, STATES SHE NEEDS A REFILL ON TESTOSTERONE CREAM, 7.5 MG/1ML)    History of Present Illness:  Noé is a 39 y.o.  presents for depo provera. Last injection was 2/3/2025.     No LMP recorded (lmp unknown). Patient has had an injection.    Review of Systems:  Patient reports no abdominal pain. She reports no hematuria, no abnormal bleeding, no flank pain, no trouble urinating, no incontinence, no rash, no lesion, no discharge, no vaginal odor, and no vaginal itching.     OB History          2    Para   1    Term   1            AB   1    Living   1         SAB        IAB        Ectopic        Multiple        Live Births   1                 Past Medical History:   Diagnosis Date    ADHD     Athlete's heart        Current Medications[1]    Review of patient's allergies indicates:   Allergen Reactions    Cat dander Itching and Swelling    Grass pollen-orchardgrass, standard Itching and Swelling     congestion       Past Surgical History:   Procedure Laterality Date    AUGMENTATION OF BREAST      CERVICAL BIOPSY  W/ LOOP ELECTRODE EXCISION      COSMETIC SURGERY  2021    Breast implants    EXCISIONAL HEMORRHOIDECTOMY N/A 09/15/2023    Procedure: HEMORRHOIDECTOMY;  Surgeon: Jamie Romero MD;  Location: [facility];  Service: General;  Laterality: N/A;    INTRAUTERINE DEVICE INSERTION      REMOVAL OF INTRAUTERINE DEVICE (IUD)         Family History   Problem Relation Name Age of Onset    Ovarian cancer Mother Rebekah        Social History[2]    Physical Exam:  /72   Pulse 72   Wt 62.8 kg (138 lb 8 oz)   LMP  (LMP Unknown)   BMI 21.69 kg/m²   Recent Results (from the past 24 hours)   POCT Urine Pregnancy    Collection Time: 25  9:26 AM   Result Value Ref Range    POC Preg Test, Ur Negative Negative     Acceptable Yes      APPEARANCE: Well nourished, well developed, in no acute distress.  PSYCH: Appropriate mood  and affect.  EXTREMITIES: No edema.       Assessment/Plan:  Encounter for Depo-Provera contraception  -     medroxyPROGESTERone (DEPO-PROVERA) injection 150 mg  -     POCT Urine Pregnancy; Future; Expected date: 04/28/2025      Follow up in about 3 months (around 7/28/2025) for DEPO. In addition to their scheduled FU, the patient has also been instructed to follow up on as needed basis  All questions were answered and the patient voiced understanding of the above issues.           [1]   Current Outpatient Medications:     cetirizine (ZYRTEC) 10 MG tablet, Take 10 mg by mouth every evening., Disp: , Rfl:     estradioL (ESTRACE) 0.01 % (0.1 mg/gram) vaginal cream, place 1 gram vaginally ONCE DAILY, Disp: 42.5 g, Rfl: 5    ketoconazole (NIZORAL) 2 % shampoo, Apply topically 2 (two) times daily as needed., Disp: , Rfl:     lisdexamfetamine (VYVANSE) 60 MG capsule, Take 60 mg by mouth every morning., Disp: , Rfl:     medroxyPROGESTERone (DEPO-PROVERA) 150 mg/mL injection, Inject 150 mg into the muscle., Disp: , Rfl:     testosterone 1 % (25 mg/2.5gram) GlPk, APPLY 1ML (FOUR CLICKS) TOPICALLY DAILY AT BEDTIME (ROTATE SITES), Disp: , Rfl:   No current facility-administered medications for this visit.  [2]   Social History  Socioeconomic History    Marital status: Single   Tobacco Use    Smoking status: Former     Average packs/day: 0.5 packs/day for 7.0 years (3.5 ttl pk-yrs)     Types: Cigarettes     Start date: 01/2002     Passive exposure: Past    Smokeless tobacco: Never   Substance and Sexual Activity    Alcohol use: Yes     Alcohol/week: 3.0 standard drinks of alcohol     Types: 3 Drinks containing 0.5 oz of alcohol per week     Comment: social 2-3 times per month    Drug use: Never    Sexual activity: Yes     Partners: Male     Birth control/protection: Injection

## 2025-07-21 ENCOUNTER — CLINICAL SUPPORT (OUTPATIENT)
Dept: OBSTETRICS AND GYNECOLOGY | Facility: CLINIC | Age: 40
End: 2025-07-21
Payer: MEDICAID

## 2025-07-21 VITALS
DIASTOLIC BLOOD PRESSURE: 72 MMHG | WEIGHT: 138 LBS | SYSTOLIC BLOOD PRESSURE: 128 MMHG | BODY MASS INDEX: 21.66 KG/M2 | HEART RATE: 104 BPM | HEIGHT: 67 IN | RESPIRATION RATE: 18 BRPM | OXYGEN SATURATION: 99 %

## 2025-07-21 DIAGNOSIS — Z30.42 ENCOUNTER FOR DEPO-PROVERA CONTRACEPTION: Primary | ICD-10-CM

## 2025-07-21 LAB
B-HCG UR QL: NEGATIVE
CTP QC/QA: YES

## 2025-07-21 RX ORDER — MEDROXYPROGESTERONE ACETATE 150 MG/ML
150 INJECTION, SUSPENSION INTRAMUSCULAR
Status: COMPLETED | OUTPATIENT
Start: 2025-07-21 | End: 2025-07-21

## 2025-07-21 RX ADMIN — MEDROXYPROGESTERONE ACETATE 150 MG: 150 INJECTION, SUSPENSION INTRAMUSCULAR at 08:07

## 2025-07-21 NOTE — PROGRESS NOTES
"Chief Complaint:  injection (Presents to clinic for depo injection. No c/o's. )    History of Present Illness:  Noé is a 40 y.o.  presents for depo provera. Last injection was 2025    No LMP recorded. Patient has had an injection.    Review of Systems:  Patient reports no abdominal pain. She reports no hematuria, no abnormal bleeding, no flank pain, no trouble urinating, no incontinence, no rash, no lesion, no discharge, no vaginal odor, and no vaginal itching.     OB History          2    Para   1    Term   1            AB   1    Living   1         SAB        IAB        Ectopic        Multiple        Live Births   1               Past Medical History:   Diagnosis Date    ADHD     Athlete's heart        Current Medications[1]    Review of patient's allergies indicates:   Allergen Reactions    Cat dander Itching and Swelling    Grass pollen-orchardgrass, standard Itching and Swelling     congestion       Past Surgical History:   Procedure Laterality Date    AUGMENTATION OF BREAST      CERVICAL BIOPSY  W/ LOOP ELECTRODE EXCISION      COSMETIC SURGERY  2021    Breast implants    EXCISIONAL HEMORRHOIDECTOMY N/A 09/15/2023    Procedure: HEMORRHOIDECTOMY;  Surgeon: Jamie Romero MD;  Location: [facility];  Service: General;  Laterality: N/A;    INTRAUTERINE DEVICE INSERTION      REMOVAL OF INTRAUTERINE DEVICE (IUD)         Family History   Problem Relation Name Age of Onset    Ovarian cancer Mother Rebekah        Social History[2]    Physical Exam:  /72 (BP Location: Left arm)   Pulse 104   Resp 18   Ht 5' 7" (1.702 m)   Wt 62.6 kg (138 lb)   SpO2 99%   BMI 21.61 kg/m²   Recent Results (from the past 24 hours)   POCT Urine Pregnancy    Collection Time: 25  8:51 AM   Result Value Ref Range    POC Preg Test, Ur Negative Negative     Acceptable Yes      APPEARANCE: Well nourished, well developed, in no acute distress.  PSYCH: Appropriate mood and " affect.  EXTREMITIES: No edema.       Assessment/Plan:  Encounter for Depo-Provera contraception  -     POCT Urine Pregnancy  -     medroxyPROGESTERone (DEPO-PROVERA) injection 150 mg      Follow up in about 3 months (around 10/21/2025) for ANNUAL, DEPO. In addition to their scheduled FU, the patient has also been instructed to follow up on as needed basis  All questions were answered and the patient voiced understanding of the above issues.           [1]   Current Outpatient Medications:     cetirizine (ZYRTEC) 10 MG tablet, Take 10 mg by mouth every evening., Disp: , Rfl:     estradioL (ESTRACE) 0.01 % (0.1 mg/gram) vaginal cream, place 1 gram vaginally ONCE DAILY, Disp: 42.5 g, Rfl: 5    ketoconazole (NIZORAL) 2 % shampoo, Apply topically 2 (two) times daily as needed., Disp: , Rfl:     lisdexamfetamine (VYVANSE) 60 MG capsule, Take 60 mg by mouth every morning., Disp: , Rfl:     medroxyPROGESTERone (DEPO-PROVERA) 150 mg/mL injection, Inject 150 mg into the muscle., Disp: , Rfl:     testosterone 1 % (25 mg/2.5gram) GlPk, APPLY 1ML (FOUR CLICKS) TOPICALLY DAILY AT BEDTIME (ROTATE SITES), Disp: , Rfl:   No current facility-administered medications for this visit.  [2]   Social History  Socioeconomic History    Marital status: Single   Tobacco Use    Smoking status: Former     Average packs/day: 0.5 packs/day for 7.0 years (3.5 ttl pk-yrs)     Types: Cigarettes     Start date: 01/2002     Passive exposure: Past    Smokeless tobacco: Never   Substance and Sexual Activity    Alcohol use: Yes     Alcohol/week: 3.0 standard drinks of alcohol     Types: 3 Drinks containing 0.5 oz of alcohol per week     Comment: social 2-3 times per month    Drug use: Never    Sexual activity: Yes     Partners: Male     Birth control/protection: Injection

## (undated) DEVICE — CUSHION PRONE VIEW SMALL

## (undated) DEVICE — SCRUB HIBICLENS 4% CHG 4OZ

## (undated) DEVICE — KIT MAJOR SINGLE BASIN

## (undated) DEVICE — NDL 22GA X1 1/2 REG BEVEL

## (undated) DEVICE — PACK BASIC

## (undated) DEVICE — SPONGE SURGIFOAM 100 8.5X12X10

## (undated) DEVICE — SUT PROLENE 2-0 36 V-7V-7

## (undated) DEVICE — SUT PLN GUT 2-0 CT-3 1X27

## (undated) DEVICE — SYR 10CC LUER LOCK

## (undated) DEVICE — DRAPE LAP T SHT W/ INSTR PAD

## (undated) DEVICE — SCALPEL #15 BLADE STRL DISP.

## (undated) DEVICE — HEMOSTAT SURGICEL 4X8IN

## (undated) DEVICE — ALCOHOL 70% ISO RUBBING 16OZ

## (undated) DEVICE — STAPLER HEMORRHOID 33MM STERIL

## (undated) DEVICE — TRAY DRY SKIN SCRUB PREP

## (undated) DEVICE — SPONGE COTTON TRAY 4X4IN